# Patient Record
Sex: FEMALE | Race: WHITE | NOT HISPANIC OR LATINO | Employment: OTHER | URBAN - METROPOLITAN AREA
[De-identification: names, ages, dates, MRNs, and addresses within clinical notes are randomized per-mention and may not be internally consistent; named-entity substitution may affect disease eponyms.]

---

## 2021-07-26 ENCOUNTER — HOSPITAL ENCOUNTER (INPATIENT)
Facility: MEDICAL CENTER | Age: 86
LOS: 7 days | DRG: 871 | End: 2021-08-02
Attending: INTERNAL MEDICINE | Admitting: INTERNAL MEDICINE
Payer: MEDICARE

## 2021-07-26 ENCOUNTER — APPOINTMENT (OUTPATIENT)
Dept: RADIOLOGY | Facility: MEDICAL CENTER | Age: 86
DRG: 871 | End: 2021-07-26
Attending: INTERNAL MEDICINE
Payer: MEDICARE

## 2021-07-26 DIAGNOSIS — A41.4: ICD-10-CM

## 2021-07-26 DIAGNOSIS — R65.21: ICD-10-CM

## 2021-07-26 DIAGNOSIS — N17.9 AKI (ACUTE KIDNEY INJURY) (HCC): ICD-10-CM

## 2021-07-26 PROBLEM — E87.1 HYPONATREMIA: Status: ACTIVE | Noted: 2021-07-26

## 2021-07-26 PROBLEM — Z78.9 FULL CODE STATUS: Status: ACTIVE | Noted: 2021-07-26

## 2021-07-26 PROBLEM — K51.00 PANCOLITIS (HCC): Status: ACTIVE | Noted: 2021-07-26

## 2021-07-26 PROBLEM — Z95.0 HISTORY OF PACEMAKER: Status: ACTIVE | Noted: 2021-07-26

## 2021-07-26 LAB
ALBUMIN SERPL BCP-MCNC: 2.5 G/DL (ref 3.2–4.9)
ALBUMIN/GLOB SERPL: 1 G/DL
ALP SERPL-CCNC: 85 U/L (ref 30–99)
ALT SERPL-CCNC: 10 U/L (ref 2–50)
ANION GAP SERPL CALC-SCNC: 9 MMOL/L (ref 7–16)
AST SERPL-CCNC: 9 U/L (ref 12–45)
BASOPHILS # BLD AUTO: 0 % (ref 0–1.8)
BASOPHILS # BLD: 0 K/UL (ref 0–0.12)
BILIRUB SERPL-MCNC: 0.2 MG/DL (ref 0.1–1.5)
BUN SERPL-MCNC: 17 MG/DL (ref 8–22)
BURR CELLS BLD QL SMEAR: NORMAL
CALCIUM SERPL-MCNC: 8.6 MG/DL (ref 8.4–10.2)
CHLORIDE SERPL-SCNC: 99 MMOL/L (ref 96–112)
CO2 SERPL-SCNC: 20 MMOL/L (ref 20–33)
CREAT SERPL-MCNC: 1.27 MG/DL (ref 0.5–1.4)
EOSINOPHIL # BLD AUTO: 0.34 K/UL (ref 0–0.51)
EOSINOPHIL NFR BLD: 1 % (ref 0–6.9)
ERYTHROCYTE [DISTWIDTH] IN BLOOD BY AUTOMATED COUNT: 45.3 FL (ref 35.9–50)
GLOBULIN SER CALC-MCNC: 2.4 G/DL (ref 1.9–3.5)
GLUCOSE SERPL-MCNC: 100 MG/DL (ref 65–99)
HCT VFR BLD AUTO: 28.8 % (ref 37–47)
HGB BLD-MCNC: 9.7 G/DL (ref 12–16)
INR PPP: 1.43 (ref 0.87–1.13)
LACTATE BLD-SCNC: 1.2 MMOL/L (ref 0.5–2)
LYMPHOCYTES # BLD AUTO: 0.68 K/UL (ref 1–4.8)
LYMPHOCYTES NFR BLD: 2 % (ref 22–41)
MAGNESIUM SERPL-MCNC: 1.7 MG/DL (ref 1.5–2.5)
MANUAL DIFF BLD: NORMAL
MCH RBC QN AUTO: 31.5 PG (ref 27–33)
MCHC RBC AUTO-ENTMCNC: 33.7 G/DL (ref 33.6–35)
MCV RBC AUTO: 93.5 FL (ref 81.4–97.8)
MONOCYTES # BLD AUTO: 2.05 K/UL (ref 0–0.85)
MONOCYTES NFR BLD AUTO: 6 % (ref 0–13.4)
NEUTROPHILS # BLD AUTO: 31.03 K/UL (ref 2–7.15)
NEUTROPHILS NFR BLD: 91 % (ref 44–72)
NRBC # BLD AUTO: 0 K/UL
NRBC BLD-RTO: 0 /100 WBC
PHOSPHATE SERPL-MCNC: 2.5 MG/DL (ref 2.5–4.5)
PLATELET # BLD AUTO: 285 K/UL (ref 164–446)
PLATELET BLD QL SMEAR: NORMAL
PMV BLD AUTO: 10.1 FL (ref 9–12.9)
POIKILOCYTOSIS BLD QL SMEAR: NORMAL
POTASSIUM SERPL-SCNC: 4.7 MMOL/L (ref 3.6–5.5)
PROT SERPL-MCNC: 4.9 G/DL (ref 6–8.2)
PROTHROMBIN TIME: 16.4 SEC (ref 12–14.6)
RBC # BLD AUTO: 3.08 M/UL (ref 4.2–5.4)
RBC BLD AUTO: PRESENT
SODIUM SERPL-SCNC: 128 MMOL/L (ref 135–145)
WBC # BLD AUTO: 34.1 K/UL (ref 4.8–10.8)

## 2021-07-26 PROCEDURE — 84484 ASSAY OF TROPONIN QUANT: CPT

## 2021-07-26 PROCEDURE — 83735 ASSAY OF MAGNESIUM: CPT

## 2021-07-26 PROCEDURE — 74018 RADEX ABDOMEN 1 VIEW: CPT

## 2021-07-26 PROCEDURE — 700105 HCHG RX REV CODE 258: Performed by: INTERNAL MEDICINE

## 2021-07-26 PROCEDURE — 94760 N-INVAS EAR/PLS OXIMETRY 1: CPT

## 2021-07-26 PROCEDURE — A9270 NON-COVERED ITEM OR SERVICE: HCPCS | Performed by: INTERNAL MEDICINE

## 2021-07-26 PROCEDURE — 85027 COMPLETE CBC AUTOMATED: CPT

## 2021-07-26 PROCEDURE — 85007 BL SMEAR W/DIFF WBC COUNT: CPT

## 2021-07-26 PROCEDURE — 36556 INSERT NON-TUNNEL CV CATH: CPT | Mod: RT | Performed by: INTERNAL MEDICINE

## 2021-07-26 PROCEDURE — 700101 HCHG RX REV CODE 250: Performed by: INTERNAL MEDICINE

## 2021-07-26 PROCEDURE — 85610 PROTHROMBIN TIME: CPT

## 2021-07-26 PROCEDURE — 87040 BLOOD CULTURE FOR BACTERIA: CPT

## 2021-07-26 PROCEDURE — 99291 CRITICAL CARE FIRST HOUR: CPT | Mod: 25 | Performed by: INTERNAL MEDICINE

## 2021-07-26 PROCEDURE — 83605 ASSAY OF LACTIC ACID: CPT

## 2021-07-26 PROCEDURE — 93005 ELECTROCARDIOGRAM TRACING: CPT | Performed by: INTERNAL MEDICINE

## 2021-07-26 PROCEDURE — 02HV33Z INSERTION OF INFUSION DEVICE INTO SUPERIOR VENA CAVA, PERCUTANEOUS APPROACH: ICD-10-PCS | Performed by: INTERNAL MEDICINE

## 2021-07-26 PROCEDURE — 770022 HCHG ROOM/CARE - ICU (200)

## 2021-07-26 PROCEDURE — 80053 COMPREHEN METABOLIC PANEL: CPT

## 2021-07-26 PROCEDURE — 700102 HCHG RX REV CODE 250 W/ 637 OVERRIDE(OP): Performed by: INTERNAL MEDICINE

## 2021-07-26 PROCEDURE — 84100 ASSAY OF PHOSPHORUS: CPT

## 2021-07-26 PROCEDURE — 700111 HCHG RX REV CODE 636 W/ 250 OVERRIDE (IP): Performed by: INTERNAL MEDICINE

## 2021-07-26 RX ORDER — ONDANSETRON 4 MG/1
4 TABLET, ORALLY DISINTEGRATING ORAL EVERY 4 HOURS PRN
Status: DISCONTINUED | OUTPATIENT
Start: 2021-07-26 | End: 2021-08-02 | Stop reason: HOSPADM

## 2021-07-26 RX ORDER — ONDANSETRON 2 MG/ML
4 INJECTION INTRAMUSCULAR; INTRAVENOUS EVERY 4 HOURS PRN
Status: DISCONTINUED | OUTPATIENT
Start: 2021-07-26 | End: 2021-08-02 | Stop reason: HOSPADM

## 2021-07-26 RX ORDER — NOREPINEPHRINE BITARTRATE 0.03 MG/ML
.5-3 INJECTION, SOLUTION INTRAVENOUS CONTINUOUS
Status: DISCONTINUED | OUTPATIENT
Start: 2021-07-26 | End: 2021-07-28

## 2021-07-26 RX ORDER — SIMVASTATIN 10 MG
10 TABLET ORAL EVERY EVENING
Status: DISCONTINUED | OUTPATIENT
Start: 2021-07-26 | End: 2021-08-02 | Stop reason: HOSPADM

## 2021-07-26 RX ORDER — OMEPRAZOLE 20 MG/1
20 CAPSULE, DELAYED RELEASE ORAL DAILY
Status: DISCONTINUED | OUTPATIENT
Start: 2021-07-26 | End: 2021-07-27

## 2021-07-26 RX ORDER — ACETAMINOPHEN 325 MG/1
650 TABLET ORAL EVERY 6 HOURS PRN
Status: DISCONTINUED | OUTPATIENT
Start: 2021-07-26 | End: 2021-08-02 | Stop reason: HOSPADM

## 2021-07-26 RX ORDER — SODIUM CHLORIDE, SODIUM LACTATE, POTASSIUM CHLORIDE, CALCIUM CHLORIDE 600; 310; 30; 20 MG/100ML; MG/100ML; MG/100ML; MG/100ML
INJECTION, SOLUTION INTRAVENOUS CONTINUOUS
Status: DISCONTINUED | OUTPATIENT
Start: 2021-07-26 | End: 2021-07-28

## 2021-07-26 RX ADMIN — METRONIDAZOLE 500 MG: 500 INJECTION, SOLUTION INTRAVENOUS at 16:20

## 2021-07-26 RX ADMIN — ACETAMINOPHEN 650 MG: 325 TABLET, FILM COATED ORAL at 17:53

## 2021-07-26 RX ADMIN — NOREPINEPHRINE BITARTRATE 10 MCG/MIN: 1 INJECTION, SOLUTION, CONCENTRATE INTRAVENOUS at 17:15

## 2021-07-26 RX ADMIN — OMEPRAZOLE 20 MG: 20 CAPSULE, DELAYED RELEASE ORAL at 16:21

## 2021-07-26 RX ADMIN — ENOXAPARIN SODIUM 40 MG: 40 INJECTION SUBCUTANEOUS at 16:20

## 2021-07-26 RX ADMIN — METRONIDAZOLE 500 MG: 500 INJECTION, SOLUTION INTRAVENOUS at 22:03

## 2021-07-26 RX ADMIN — SIMVASTATIN 10 MG: 10 TABLET, FILM COATED ORAL at 17:15

## 2021-07-26 RX ADMIN — SODIUM CHLORIDE, POTASSIUM CHLORIDE, SODIUM LACTATE AND CALCIUM CHLORIDE: 600; 310; 30; 20 INJECTION, SOLUTION INTRAVENOUS at 16:20

## 2021-07-26 RX ADMIN — VANCOMYCIN HYDROCHLORIDE 500 MG: KIT ORAL at 17:15

## 2021-07-26 ASSESSMENT — COGNITIVE AND FUNCTIONAL STATUS - GENERAL
DRESSING REGULAR UPPER BODY CLOTHING: A LITTLE
STANDING UP FROM CHAIR USING ARMS: A LITTLE
PERSONAL GROOMING: A LITTLE
DRESSING REGULAR LOWER BODY CLOTHING: A LITTLE
MOBILITY SCORE: 19
CLIMB 3 TO 5 STEPS WITH RAILING: A LITTLE
MOVING FROM LYING ON BACK TO SITTING ON SIDE OF FLAT BED: A LITTLE
TOILETING: A LITTLE
MOVING TO AND FROM BED TO CHAIR: A LITTLE
SUGGESTED CMS G CODE MODIFIER DAILY ACTIVITY: CJ
WALKING IN HOSPITAL ROOM: A LITTLE
DAILY ACTIVITIY SCORE: 20
SUGGESTED CMS G CODE MODIFIER MOBILITY: CK

## 2021-07-26 ASSESSMENT — ENCOUNTER SYMPTOMS
SORE THROAT: 0
VOMITING: 0
STRIDOR: 0
SINUS PAIN: 0
FOCAL WEAKNESS: 0
EYE DISCHARGE: 0
DIZZINESS: 0
ABDOMINAL PAIN: 0
NAUSEA: 0
PSYCHIATRIC NEGATIVE: 1
FEVER: 0
WHEEZING: 0
CHILLS: 0
SENSORY CHANGE: 0
EYE PAIN: 0
WEIGHT LOSS: 0
HEADACHES: 0
COUGH: 0
ORTHOPNEA: 0
MYALGIAS: 0
LOSS OF CONSCIOUSNESS: 0
ROS GI COMMENTS: DISTENSION
SHORTNESS OF BREATH: 0
DIARRHEA: 0
SPUTUM PRODUCTION: 0

## 2021-07-26 ASSESSMENT — LIFESTYLE VARIABLES
ALCOHOL_USE: YES
HAVE PEOPLE ANNOYED YOU BY CRITICIZING YOUR DRINKING: NO
TOTAL SCORE: 0
EVER HAD A DRINK FIRST THING IN THE MORNING TO STEADY YOUR NERVES TO GET RID OF A HANGOVER: NO
CONSUMPTION TOTAL: NEGATIVE
TOTAL SCORE: 0
EVER FELT BAD OR GUILTY ABOUT YOUR DRINKING: NO
TOTAL SCORE: 0
HAVE YOU EVER FELT YOU SHOULD CUT DOWN ON YOUR DRINKING: NO
ON A TYPICAL DAY WHEN YOU DRINK ALCOHOL HOW MANY DRINKS DO YOU HAVE: 1
AVERAGE NUMBER OF DAYS PER WEEK YOU HAVE A DRINK CONTAINING ALCOHOL: 3
HOW MANY TIMES IN THE PAST YEAR HAVE YOU HAD 5 OR MORE DRINKS IN A DAY: 0

## 2021-07-26 ASSESSMENT — PATIENT HEALTH QUESTIONNAIRE - PHQ9
SUM OF ALL RESPONSES TO PHQ9 QUESTIONS 1 AND 2: 0
1. LITTLE INTEREST OR PLEASURE IN DOING THINGS: NOT AT ALL
2. FEELING DOWN, DEPRESSED, IRRITABLE, OR HOPELESS: NOT AT ALL

## 2021-07-26 NOTE — ASSESSMENT & PLAN NOTE
Due to cdiff colitis  Images from outside hospital have been uploaded  Cont daily KUB  Plan of care as outlined above.

## 2021-07-26 NOTE — PROGRESS NOTES
[facility]PITAL[facility] TRIAGE OFFICER DIRECT ADMISSION REPORT  Transferring facility: [facility]  Transferring physician: Pasquale Ghotra M.D.  Transferring facility/physician contact number: [phone]  Chief complaint: Diarrhea  Pertinent history & patient course: Admitted OBV at OSH for hyponatremia and diarrhea. Started on IVF and C diff PCR +. Worsening hypotension today despite IVF and albumin, started on norepinephrine infusion. Critical access hospital, unable to provide CC management.  Pertinent imaging & lab results: CT with pancolitis and no ileus/megacolon. C diff PCR positive.  Code Status: FULL per transferring provider, I personally verified with the transferring provider patient's code status and the transferring provider has confirmed this with the patient.  Further work up or recommendations per triage officer prior to transfer: continue Levophed  Consultants called prior to transfer and pertinent input from consultants: [facility] Intensivist   Patient accepted for transfer: Yes  Consultants to be called upon arrival: none  Admission status: Inpatient.   Floor requested: ICU  If ICU transfer, name of intensivist case discussed with and pertinent input from critical care: Zeinab    Please inform the triage officer upon arrival of the patient to [facility] for assignment of a hospitalist to perform admission.     For any question or concerns regarding the care of this patient, please reach out to the assigned hospitalist.

## 2021-07-26 NOTE — ASSESSMENT & PLAN NOTE
This is Septic shock Present on admission  SIRS criteria identified on my evaluation include: Tachycardia, with heart rate greater than 90 BPM and Bandemia, greater than 10% bands  Source is c diff pancolitis  Presentation includes: Severe sepsis present and persistent hypotension after 30 ml/kg completed.   Despite appropriate fluid resuscitation with crystalloid given per sepsis guidelines, the patient remains hypotensive with systolic blood pressure less than 90 or MAP less than 65  Hemodynamic support with additional fluids and IV vasopressors as needed to maintain a SBP of 90 or MAP of 65  IV antibiotics as appropriate for source of sepsis  Reassessment: I have reassessed the patient's hemodynamic status    Has made dramatic improvement in the last 24 hours, weaned off of Levophed, WBC from 39 -> 21k  Appreciate ID and Gen surg input  Lactic acid < 2, renal function stable  Cont LR @100 cc/hr, start CLD  Transfer to telemetry, maintain hernandez and central line for now

## 2021-07-26 NOTE — CONSULTS
Critical Care Consultation    Date of consult: 7/26/2021    Referring Physician  Dr STEVEN Ghotra    Reason for Consultation  Septic shock due to C. difficile pancolitis    History of Presenting Illness    Very pleasant 89-year-old female with past medical history of pacemaker placement, hypertension, hyperlipidemia, hydrochlorothiazide, lisinopril, metoprolol, omeprazole, simvastatin transferred from [facility] in [place], [place] on 7/26/2021 for septic shock due to C. difficile pancolitis.  She presented there on 7/23 with complaints of weakness and diarrhea.    Prior to admission she had several days of soft normal colored stools without blood along with occasional nausea and nonbloody nonbilious emesis.  She was admitted and started on IV fluids and empiric antibiotics with vancomycin/Zosyn.  KARIS noted on admission.  She became hypotensive and febrile on hospital day #2, prompting labs showing a substantial leukocytosis and C. difficile positive stool.  CT abdomen/pelvis 7/25 revealed pancolitis without SBO or megacolon.  Diverticulosis without diverticulitis in the descending and sigmoid colon. IV antibiotics were discontinued and she was transitioned to oral vancomycin and IV Flagyl.  Blood cultures reportedly negative.  Hypotension was refractory to resuscitation she was started on IV vasopressors for hemodynamic support was subsequently transferred for higher level of care.    Labs this morning from outside hospital notable for WBC 34.9, hemoglobin 9.1, platelets 259.  20% bands.    Sodium 133, potassium 3.9, bicarb 21, creatinine 1.48    Stool PCR collected 7/25+ for C. difficile toxin A/P.  SARS-CoV-2 rapid JOSE negative.    Cardiac: levophed @ 3mcg/min  Pulm: 3LPM NC  Neuro: intact, speech slightly delayed  Heme: Leukocytosis, bandemia  ID:  PO vanco, IV Flagyl  GI/endo: NPO, sips with meds, LR @100cc/hr  Labs/Imaging: AXR and CXR ordered  Lines: RIJ central line placed on admit  7/26/2021  Mobility: bedrest  Symptoms: distension    Code Status  Full Code    Review of Systems  Review of Systems   Constitutional: Negative for chills, fever and weight loss.   HENT: Negative for congestion, sinus pain and sore throat.    Eyes: Negative for pain and discharge.   Respiratory: Negative for cough, sputum production, shortness of breath, wheezing and stridor.    Cardiovascular: Negative for chest pain, orthopnea and leg swelling.   Gastrointestinal: Negative for abdominal pain, diarrhea, nausea and vomiting.        Distension   Genitourinary: Negative for dysuria, frequency and urgency.   Musculoskeletal: Negative for myalgias.   Skin: Negative for rash.   Neurological: Negative for dizziness, sensory change, focal weakness, loss of consciousness and headaches.   Psychiatric/Behavioral: Negative.    All other systems reviewed and are negative.    Past Medical History   has a past medical history of Anxiety, Arrhythmia, Cough, Hypertension, and Pacemaker.  past medical history of pacemaker placement, hypertension, hyperlipidemia, hydrochlorothiazide, lisinopril, metoprolol, omeprazole, simvastatin    Surgical History  Pacemaker placement    Family History  Family history reviewed and no significant conditions or diseases relevant to the chief complaint were identified    Social History   reports that she quit smoking about 41 years ago. She has never used smokeless tobacco. She reports current alcohol use of about 1.8 oz of alcohol per week. She reports that she does not use drugs.    Medications  Home Medications    **Home medications have not yet been reviewed for this encounter**       Current Facility-Administered Medications   Medication Dose Route Frequency Provider Last Rate Last Admin   • lactated ringers infusion   Intravenous Continuous Mp Forde M.D.       • enoxaparin (LOVENOX) inj 40 mg  40 mg Subcutaneous DAILY Mp Forde M.D.       • ondansetron (ZOFRAN) syringe/vial  injection 4 mg  4 mg Intravenous Q4HRS PRN Mp Forde M.D.       • ondansetron (ZOFRAN ODT) dispertab 4 mg  4 mg Oral Q4HRS PRN Mp Forde M.D.       • omeprazole (PRILOSEC) capsule 20 mg  20 mg Oral DAILY Mp Forde M.D.       • simvastatin (ZOCOR) tablet 10 mg  10 mg Oral Q EVENING Mp Forde M.D.       • vancomycin 50 mg/mL oral soln 500 mg  500 mg Oral Q6HRS Mp Forde M.D.        And   • metroNIDAZOLE (FLAGYL) IVPB 500 mg  500 mg Intravenous Q8HRS Mp Forde M.D.         Allergies  No Known Allergies    Vital Signs last 24 hours  Temp:  [36.8 °C (98.3 °F)] 36.8 °C (98.3 °F)  Pulse:  [56-70] 65  Resp:  [26-32] 32  BP: ()/(41-61) 108/61  SpO2:  [93 %-97 %] 93 %    Physical Exam  Physical Exam  Vitals and nursing note reviewed.   Constitutional:       General: She is not in acute distress.     Appearance: Normal appearance. She is well-developed. She is not diaphoretic.   Eyes:      General: No scleral icterus.        Right eye: No discharge.         Left eye: No discharge.      Conjunctiva/sclera: Conjunctivae normal.      Pupils: Pupils are equal, round, and reactive to light.   Neck:      Thyroid: No thyromegaly.      Vascular: No JVD.   Cardiovascular:      Rate and Rhythm: Normal rate and regular rhythm.      Heart sounds: Normal heart sounds. No murmur heard.   No gallop.    Pulmonary:      Effort: Pulmonary effort is normal. No respiratory distress.      Breath sounds: Normal breath sounds. No wheezing or rales.   Abdominal:      General: There is distension.      Palpations: Abdomen is soft.      Tenderness: There is abdominal tenderness. There is no guarding.      Comments: Decreased BS   Musculoskeletal:         General: No tenderness.   Lymphadenopathy:      Cervical: No cervical adenopathy.   Skin:     General: Skin is warm.      Capillary Refill: Capillary refill takes less than 2 seconds.      Findings: No erythema or rash.   Neurological:      General:  No focal deficit present.      Mental Status: She is alert and oriented to person, place, and time.      Cranial Nerves: No cranial nerve deficit.      Sensory: No sensory deficit.      Motor: No abnormal muscle tone.   Psychiatric:         Behavior: Behavior normal.       Fluids  No intake or output data in the 24 hours ending 07/26/21 1602    Laboratory  Recent Results (from the past 48 hour(s))   Prothrombin time (INR)    Collection Time: 07/26/21  3:20 PM   Result Value Ref Range    PT 16.4 (H) 12.0 - 14.6 sec    INR 1.43 (H) 0.87 - 1.13   CBC with Differential    Collection Time: 07/26/21  3:20 PM   Result Value Ref Range    WBC 34.1 (HH) 4.8 - 10.8 K/uL    RBC 3.08 (L) 4.20 - 5.40 M/uL    Hemoglobin 9.7 (L) 12.0 - 16.0 g/dL    Hematocrit 28.8 (L) 37.0 - 47.0 %    MCV 93.5 81.4 - 97.8 fL    MCH 31.5 27.0 - 33.0 pg    MCHC 33.7 33.6 - 35.0 g/dL    RDW 45.3 35.9 - 50.0 fL    Platelet Count 285 164 - 446 K/uL    MPV 10.1 9.0 - 12.9 fL    Neutrophils-Polys 91.00 (H) 44.00 - 72.00 %    Lymphocytes 2.00 (L) 22.00 - 41.00 %    Monocytes 6.00 0.00 - 13.40 %    Eosinophils 1.00 0.00 - 6.90 %    Basophils 0.00 0.00 - 1.80 %    Nucleated RBC 0.00 /100 WBC    Neutrophils (Absolute) 31.03 (H) 2.00 - 7.15 K/uL    Lymphs (Absolute) 0.68 (L) 1.00 - 4.80 K/uL    Monos (Absolute) 2.05 (H) 0.00 - 0.85 K/uL    Eos (Absolute) 0.34 0.00 - 0.51 K/uL    Baso (Absolute) 0.00 0.00 - 0.12 K/uL    NRBC (Absolute) 0.00 K/uL   Comp Metabolic Panel    Collection Time: 07/26/21  3:20 PM   Result Value Ref Range    Sodium 128 (L) 135 - 145 mmol/L    Potassium 4.7 3.6 - 5.5 mmol/L    Chloride 99 96 - 112 mmol/L    Co2 20 20 - 33 mmol/L    Anion Gap 9.0 7.0 - 16.0    Glucose 100 (H) 65 - 99 mg/dL    Bun 17 8 - 22 mg/dL    Creatinine 1.27 0.50 - 1.40 mg/dL    Calcium 8.6 8.4 - 10.2 mg/dL    AST(SGOT) 9 (L) 12 - 45 U/L    ALT(SGPT) 10 2 - 50 U/L    Alkaline Phosphatase 85 30 - 99 U/L    Total Bilirubin 0.2 0.1 - 1.5 mg/dL    Albumin 2.5 (L) 3.2 -  4.9 g/dL    Total Protein 4.9 (L) 6.0 - 8.2 g/dL    Globulin 2.4 1.9 - 3.5 g/dL    A-G Ratio 1.0 g/dL   LACTIC ACID    Collection Time: 07/26/21  3:20 PM   Result Value Ref Range    Lactic Acid 1.2 0.5 - 2.0 mmol/L   MAGNESIUM    Collection Time: 07/26/21  3:20 PM   Result Value Ref Range    Magnesium 1.7 1.5 - 2.5 mg/dL   PHOSPHORUS    Collection Time: 07/26/21  3:20 PM   Result Value Ref Range    Phosphorus 2.5 2.5 - 4.5 mg/dL   ESTIMATED GFR    Collection Time: 07/26/21  3:20 PM   Result Value Ref Range    GFR If  48 (A) >60 mL/min/1.73 m 2    GFR If Non African American 40 (A) >60 mL/min/1.73 m 2   DIFFERENTIAL MANUAL    Collection Time: 07/26/21  3:20 PM   Result Value Ref Range    Manual Diff Status PERFORMED    PLATELET ESTIMATE    Collection Time: 07/26/21  3:20 PM   Result Value Ref Range    Plt Estimation Normal    MORPHOLOGY    Collection Time: 07/26/21  3:20 PM   Result Value Ref Range    RBC Morphology Present     Poikilocytosis 2+     Echinocytes 2+      Imaging  [id number] VIEW    (Results Pending)   DX-CHEST-FOR LINE PLACEMENT Perform procedure in: Patient's Room    (Results Pending)      CT abdomen/pelvis 7/25 revealed pancolitis without SBO or megacolon.  Diverticulosis without diverticulitis in the descending and sigmoid colon.     Assessment/Plan    * Septic shock due to Clostridioides difficile (HCC)  Assessment & Plan  This is Septic shock Present on admission  SIRS criteria identified on my evaluation include: Tachycardia, with heart rate greater than 90 BPM and Bandemia, greater than 10% bands  Source is c diff pancolitis  Presentation includes: Severe sepsis present and persistent hypotension after 30 ml/kg completed.   Despite appropriate fluid resuscitation with crystalloid given per sepsis guidelines, the patient remains hypotensive with systolic blood pressure less than 90 or MAP less than 65  Hemodynamic support with additional fluids and IV vasopressors as needed to  maintain a SBP of 90 or MAP of 65  IV antibiotics as appropriate for source of sepsis  Reassessment: I have reassessed the patient's hemodynamic status    Central line placed  Titrate Levophed to maintain SBP greater than 85, MAP greater than 65  Adequately resuscitated on bedside ultrasound exam  LR @100 cc/hr, NPO  ATLAS 6 = severe C diff. Cont PO vanco/IV Flagyl  Trend lactate daily  Discussed surgical options, pt open to surgery/bowel resection if necessary for preservation of life    KARIS (acute kidney injury) (HCC)  Assessment & Plan  Stable  Strict IOs  Avoid nephrotoxins    History of pacemaker  Assessment & Plan  Currently rate controlled SR    Pancolitis (HCC)  Assessment & Plan  Due to cdiff colitis  Plan of care as outlined above.    Full code status  Assessment & Plan  Confirmed with patient 7/26/2021    Discussed patient condition and risk of morbidity and/or mortality with RN, RT, Pharmacy and Patient.      The patient remains critically ill.  Critical care time = 50 minutes in directly providing and coordinating critical care and extensive data review.  No time overlap and excludes procedures.    This note was generated using voice recognition software which has a chance of producing errors of grammar and content.  I have made every reasonable attempt to find and correct any errors, but it should be expected that some may not be found prior to finalization of this note.  __________  Mp Forde MD  Pulmonary and Critical Care Medicine  [facility]

## 2021-07-26 NOTE — PROGRESS NOTES
1400: Pt arrived to unit with transport team. Pt transferred to ICU bed and placed on ICU monitoring. Pt has no complaints of pain at this time, VSS. Pt not on any gtts at time of arrival. Pt resting in bed. All needs met, awaiting MD orders.

## 2021-07-26 NOTE — PROCEDURES
Central Line Insertion    Date/Time: 7/26/2021 4:01 PM  Performed by: Mp Forde M.D.  Authorized by: Mp Forde M.D.     Consent:     Consent obtained:  Written    Consent given by:  Patient    Risks discussed:  Arterial puncture, incorrect placement, nerve damage, pneumothorax, infection and bleeding    Alternatives discussed:  No treatment  Pre-procedure details:     Hand hygiene: Hand hygiene performed prior to insertion      Skin preparation:  ChloraPrep    Skin preparation agent: Skin preparation agent completely dried prior to procedure    Anesthesia:     Anesthesia method:  Local infiltration    Local anesthetic:  Lidocaine 1% w/o epi  Procedure details:     Location:  R internal jugular    Site selection rationale:  Left SC occupied by PM    Patient position:  Flat    Procedural supplies:  Triple lumen    Catheter size:  7.5 Fr    Landmarks identified: yes      Ultrasound guidance: yes      Sterile ultrasound techniques: Sterile gel and sterile probe covers were used      Number of attempts:  1    Successful placement: yes    Post-procedure details:     Post-procedure:  Dressing applied and line sutured    Assessment:  Blood return through all ports, no pneumothorax on x-ray, free fluid flow and placement verified by x-ray    Patient tolerance of procedure:  Tolerated well, no immediate complications    __________  Mp Forde MD  Pulmonary and Critical Care Medicine  [facility]

## 2021-07-27 ENCOUNTER — HOSPITAL ENCOUNTER (OUTPATIENT)
Dept: RADIOLOGY | Facility: MEDICAL CENTER | Age: 86
End: 2021-07-27

## 2021-07-27 ENCOUNTER — APPOINTMENT (OUTPATIENT)
Dept: RADIOLOGY | Facility: MEDICAL CENTER | Age: 86
DRG: 871 | End: 2021-07-27
Attending: INTERNAL MEDICINE
Payer: MEDICARE

## 2021-07-27 LAB
ALBUMIN SERPL BCP-MCNC: 2.4 G/DL (ref 3.2–4.9)
ALBUMIN/GLOB SERPL: 1 G/DL
ALP SERPL-CCNC: 128 U/L (ref 30–99)
ALT SERPL-CCNC: 8 U/L (ref 2–50)
ANION GAP SERPL CALC-SCNC: 11 MMOL/L (ref 7–16)
ANION GAP SERPL CALC-SCNC: 11 MMOL/L (ref 7–16)
AST SERPL-CCNC: 7 U/L (ref 12–45)
BASOPHILS # BLD AUTO: 0.6 % (ref 0–1.8)
BASOPHILS # BLD: 0.22 K/UL (ref 0–0.12)
BILIRUB SERPL-MCNC: 0.2 MG/DL (ref 0.1–1.5)
BUN SERPL-MCNC: 16 MG/DL (ref 8–22)
BUN SERPL-MCNC: 17 MG/DL (ref 8–22)
CALCIUM SERPL-MCNC: 8.7 MG/DL (ref 8.4–10.2)
CALCIUM SERPL-MCNC: 8.8 MG/DL (ref 8.4–10.2)
CHLORIDE SERPL-SCNC: 101 MMOL/L (ref 96–112)
CHLORIDE SERPL-SCNC: 104 MMOL/L (ref 96–112)
CO2 SERPL-SCNC: 17 MMOL/L (ref 20–33)
CO2 SERPL-SCNC: 18 MMOL/L (ref 20–33)
COMMENT 1642: NORMAL
CREAT SERPL-MCNC: 0.99 MG/DL (ref 0.5–1.4)
CREAT SERPL-MCNC: 1.08 MG/DL (ref 0.5–1.4)
EKG IMPRESSION: NORMAL
EOSINOPHIL # BLD AUTO: 0.07 K/UL (ref 0–0.51)
EOSINOPHIL NFR BLD: 0.2 % (ref 0–6.9)
ERYTHROCYTE [DISTWIDTH] IN BLOOD BY AUTOMATED COUNT: 45.1 FL (ref 35.9–50)
ERYTHROCYTE [DISTWIDTH] IN BLOOD BY AUTOMATED COUNT: 45.5 FL (ref 35.9–50)
GLOBULIN SER CALC-MCNC: 2.3 G/DL (ref 1.9–3.5)
GLUCOSE SERPL-MCNC: 105 MG/DL (ref 65–99)
GLUCOSE SERPL-MCNC: 99 MG/DL (ref 65–99)
HCT VFR BLD AUTO: 28.7 % (ref 37–47)
HCT VFR BLD AUTO: 29.5 % (ref 37–47)
HGB BLD-MCNC: 9.5 G/DL (ref 12–16)
HGB BLD-MCNC: 9.9 G/DL (ref 12–16)
IMM GRANULOCYTES # BLD AUTO: 1.47 K/UL (ref 0–0.11)
IMM GRANULOCYTES NFR BLD AUTO: 3.7 % (ref 0–0.9)
LACTATE BLD-SCNC: 1.2 MMOL/L (ref 0.5–2)
LACTATE BLD-SCNC: 1.4 MMOL/L (ref 0.5–2)
LYMPHOCYTES # BLD AUTO: 0.91 K/UL (ref 1–4.8)
LYMPHOCYTES NFR BLD: 2.3 % (ref 22–41)
MAGNESIUM SERPL-MCNC: 1.5 MG/DL (ref 1.5–2.5)
MCH RBC QN AUTO: 30.9 PG (ref 27–33)
MCH RBC QN AUTO: 31.4 PG (ref 27–33)
MCHC RBC AUTO-ENTMCNC: 33.1 G/DL (ref 33.6–35)
MCHC RBC AUTO-ENTMCNC: 33.6 G/DL (ref 33.6–35)
MCV RBC AUTO: 93.5 FL (ref 81.4–97.8)
MCV RBC AUTO: 93.7 FL (ref 81.4–97.8)
MONOCYTES # BLD AUTO: 1.87 K/UL (ref 0–0.85)
MONOCYTES NFR BLD AUTO: 4.7 % (ref 0–13.4)
NEUTROPHILS # BLD AUTO: 35.85 K/UL (ref 2–7.15)
NEUTROPHILS NFR BLD: 89 % (ref 44–72)
NRBC # BLD AUTO: 0 K/UL
NRBC BLD-RTO: 0 /100 WBC
PHOSPHATE SERPL-MCNC: 2.7 MG/DL (ref 2.5–4.5)
PLATELET # BLD AUTO: 332 K/UL (ref 164–446)
PLATELET # BLD AUTO: 340 K/UL (ref 164–446)
PMV BLD AUTO: 10.2 FL (ref 9–12.9)
PMV BLD AUTO: 9.6 FL (ref 9–12.9)
POTASSIUM SERPL-SCNC: 4.8 MMOL/L (ref 3.6–5.5)
POTASSIUM SERPL-SCNC: 4.9 MMOL/L (ref 3.6–5.5)
PROT SERPL-MCNC: 4.7 G/DL (ref 6–8.2)
RBC # BLD AUTO: 3.07 M/UL (ref 4.2–5.4)
RBC # BLD AUTO: 3.15 M/UL (ref 4.2–5.4)
SODIUM SERPL-SCNC: 129 MMOL/L (ref 135–145)
SODIUM SERPL-SCNC: 133 MMOL/L (ref 135–145)
TROPONIN T SERPL-MCNC: 24 NG/L (ref 6–19)
TROPONIN T SERPL-MCNC: 31 NG/L (ref 6–19)
WBC # BLD AUTO: 32.9 K/UL (ref 4.8–10.8)
WBC # BLD AUTO: 39.4 K/UL (ref 4.8–10.8)

## 2021-07-27 PROCEDURE — 85025 COMPLETE CBC W/AUTO DIFF WBC: CPT

## 2021-07-27 PROCEDURE — 93010 ELECTROCARDIOGRAM REPORT: CPT | Performed by: INTERNAL MEDICINE

## 2021-07-27 PROCEDURE — 700101 HCHG RX REV CODE 250: Performed by: INTERNAL MEDICINE

## 2021-07-27 PROCEDURE — 85027 COMPLETE CBC AUTOMATED: CPT

## 2021-07-27 PROCEDURE — 99223 1ST HOSP IP/OBS HIGH 75: CPT | Performed by: INTERNAL MEDICINE

## 2021-07-27 PROCEDURE — 770022 HCHG ROOM/CARE - ICU (200)

## 2021-07-27 PROCEDURE — 99292 CRITICAL CARE ADDL 30 MIN: CPT | Performed by: INTERNAL MEDICINE

## 2021-07-27 PROCEDURE — 99291 CRITICAL CARE FIRST HOUR: CPT | Performed by: INTERNAL MEDICINE

## 2021-07-27 PROCEDURE — 84100 ASSAY OF PHOSPHORUS: CPT

## 2021-07-27 PROCEDURE — 80048 BASIC METABOLIC PNL TOTAL CA: CPT

## 2021-07-27 PROCEDURE — 80053 COMPREHEN METABOLIC PANEL: CPT

## 2021-07-27 PROCEDURE — 83605 ASSAY OF LACTIC ACID: CPT

## 2021-07-27 PROCEDURE — 36592 COLLECT BLOOD FROM PICC: CPT

## 2021-07-27 PROCEDURE — 84484 ASSAY OF TROPONIN QUANT: CPT

## 2021-07-27 PROCEDURE — 83735 ASSAY OF MAGNESIUM: CPT

## 2021-07-27 PROCEDURE — 700102 HCHG RX REV CODE 250 W/ 637 OVERRIDE(OP): Performed by: INTERNAL MEDICINE

## 2021-07-27 PROCEDURE — A9270 NON-COVERED ITEM OR SERVICE: HCPCS | Performed by: INTERNAL MEDICINE

## 2021-07-27 PROCEDURE — 700111 HCHG RX REV CODE 636 W/ 250 OVERRIDE (IP): Performed by: INTERNAL MEDICINE

## 2021-07-27 PROCEDURE — 74018 RADEX ABDOMEN 1 VIEW: CPT

## 2021-07-27 PROCEDURE — 700105 HCHG RX REV CODE 258: Performed by: INTERNAL MEDICINE

## 2021-07-27 RX ADMIN — SODIUM CHLORIDE, POTASSIUM CHLORIDE, SODIUM LACTATE AND CALCIUM CHLORIDE: 600; 310; 30; 20 INJECTION, SOLUTION INTRAVENOUS at 12:20

## 2021-07-27 RX ADMIN — OMEPRAZOLE 20 MG: 20 CAPSULE, DELAYED RELEASE ORAL at 05:33

## 2021-07-27 RX ADMIN — ONDANSETRON 4 MG: 2 INJECTION INTRAMUSCULAR; INTRAVENOUS at 06:08

## 2021-07-27 RX ADMIN — ENOXAPARIN SODIUM 40 MG: 40 INJECTION SUBCUTANEOUS at 05:33

## 2021-07-27 RX ADMIN — METRONIDAZOLE 500 MG: 500 INJECTION, SOLUTION INTRAVENOUS at 14:55

## 2021-07-27 RX ADMIN — METRONIDAZOLE 500 MG: 500 INJECTION, SOLUTION INTRAVENOUS at 05:34

## 2021-07-27 RX ADMIN — VANCOMYCIN HYDROCHLORIDE 500 MG: KIT ORAL at 05:33

## 2021-07-27 RX ADMIN — METRONIDAZOLE 500 MG: 500 INJECTION, SOLUTION INTRAVENOUS at 21:14

## 2021-07-27 RX ADMIN — VANCOMYCIN HYDROCHLORIDE 500 MG: KIT ORAL at 18:07

## 2021-07-27 RX ADMIN — VANCOMYCIN HYDROCHLORIDE 500 MG: KIT ORAL at 00:42

## 2021-07-27 RX ADMIN — NOREPINEPHRINE BITARTRATE 7 MCG/MIN: 1 INJECTION, SOLUTION, CONCENTRATE INTRAVENOUS at 12:19

## 2021-07-27 RX ADMIN — SODIUM CHLORIDE, POTASSIUM CHLORIDE, SODIUM LACTATE AND CALCIUM CHLORIDE: 600; 310; 30; 20 INJECTION, SOLUTION INTRAVENOUS at 22:31

## 2021-07-27 RX ADMIN — SODIUM CHLORIDE, POTASSIUM CHLORIDE, SODIUM LACTATE AND CALCIUM CHLORIDE: 600; 310; 30; 20 INJECTION, SOLUTION INTRAVENOUS at 02:22

## 2021-07-27 RX ADMIN — VANCOMYCIN HYDROCHLORIDE 500 MG: KIT ORAL at 12:10

## 2021-07-27 ASSESSMENT — ENCOUNTER SYMPTOMS
SINUS PAIN: 0
SPUTUM PRODUCTION: 0
LOSS OF CONSCIOUSNESS: 0
WHEEZING: 0
DIARRHEA: 0
SENSORY CHANGE: 0
NAUSEA: 0
WEIGHT LOSS: 0
ROS GI COMMENTS: DISTENSION
CHILLS: 0
DIZZINESS: 0
ORTHOPNEA: 0
ABDOMINAL PAIN: 0
SORE THROAT: 0
MYALGIAS: 0
FOCAL WEAKNESS: 0
HEADACHES: 0
EYE DISCHARGE: 0
PSYCHIATRIC NEGATIVE: 1
COUGH: 0
STRIDOR: 0
SHORTNESS OF BREATH: 0
FEVER: 0
EYE PAIN: 0
VOMITING: 0

## 2021-07-27 ASSESSMENT — PAIN DESCRIPTION - PAIN TYPE
TYPE: ACUTE PAIN

## 2021-07-27 NOTE — PROGRESS NOTES
"23:30 - Pt used call light to call this RN to room, this RN noticed pt started having increased work of breathing. Pt stating, \"my stomach doesn't feel right.\" Pt requesting bedpan and had medium, loose bowel movement. Pt then stating that bowel movement did not alleviate pressure. EKG obtained.  Pt remained having increased work of breathing. VSS on 2L O2 via NC, norepinephrine infusion at 7 mcg/min. Hospitalist MD notified, orders obtained and followed.   "

## 2021-07-27 NOTE — DISCHARGE PLANNING
Anticipated Discharge Disposition:   Home when medically cleared    Action:   Chart review complete.     This patient is currently in the ICU and was transferred from [facility]. Patient is on special precautions. Patient is not medically cleared to discharge at this time.     1310: RN CM called patient's daughter Susan. Assessment complete. Susan has POA paperwork and will be emailing them to RN CM.     Barriers to Discharge:   Medical Clearance    Plan:   Follow up with medical clearance  Hospital care management will continue to assist with discharge planning needs.     Care Transition Team Assessment    Information Source  Orientation Level: Oriented X4  Information Given By: Other (Comments) child  Informant's Name: Susan Franklin  Who is responsible for making decisions for patient?: Patient    Readmission Evaluation  Is this a readmission?: No    Elopement Risk  Legal Hold: No  Ambulatory or Self Mobile in Wheelchair: No-Not an Elopement Risk  Disoriented: No  Psychiatric Symptoms: None  History of Wandering: No  Elopement this Admit: No  Vocalizing Wanting to Leave: No  Displays Behaviors, Body Language Wanting to Leave: No-Not at Risk for Elopement  Elopement Risk: Not at Risk for Elopement    Interdisciplinary Discharge Planning  Does Admitting Nurse Feel This Could be a Complex Discharge?: No  Primary Care Physician: none  Lives with - Patient's Self Care Capacity: Alone and Able to Care For Self  Patient or legal guardian wants to designate a caregiver: No  Support Systems: Children,   Housing / Facility: [address]  Do You Take your Prescribed Medications Regularly: Yes  Able to Return to Previous ADL's: Future Time w/Therapy  Mobility Issues: No  Prior Services: None  Patient Prefers to be Discharged to:: home  Assistance Needed: Yes  Durable Medical Equipment: Walker, Other - Specify (cane)    Discharge Preparedness  What is your plan after discharge?: Home with help  What  are your discharge supports?: Child  Prior Functional Level: Ambulatory, Independent with Activities of Daily Living, Drives Self    Functional Assesment  Prior Functional Level: Ambulatory, Independent with Activities of Daily Living, Drives Self    Finances  Financial Barriers to Discharge: No  Prescription Coverage: Yes    Vision / Hearing Impairment  Vision Impairment : Yes  Right Eye Vision: Wears Glasses  Left Eye Vision: Wears Glasses  Hearing Impairment : No    Advance Directive  Advance Directive?: DPOA for Health Care  Durable Power of  Name and Contact : Susan Franklin  (Susan to email documentation to RN CM )    Domestic Abuse  Have you ever been the victim of abuse or violence?: No  Physical Abuse or Sexual Abuse: No  Verbal Abuse or Emotional Abuse: No  Possible Abuse/Neglect Reported to:: Not Applicable    Psychological Assessment  History of Substance Abuse: None  History of Psychiatric Problems: No    Discharge Risks or Barriers  Discharge risks or barriers?: Complex medical needs  Patient risk factors: Complex medical needs, Vulnerable adult    Anticipated Discharge Information  Discharge Disposition: Still a Patient (30)         5

## 2021-07-27 NOTE — PROGRESS NOTES
0700-Report from Moses NICHOLSON. Plan of care reviewed. Patient currently sleeping. Does not appear to be in any distress or discomfort. All lines and drips verified with off going RN.     0800-Patient positioned for comfort. At this time does not report any pain. Levophed titrated according to MAP.

## 2021-07-27 NOTE — CARE PLAN
Problem: Hemodynamics  Goal: Patient's hemodynamics, fluid balance and neurologic status will be stable or improve  Outcome: Not Met   The patient is Watcher - Medium risk of patient condition declining or worsening    Shift Goals  Clinical Goals: hemodynamic stability, decrease in vasopressor support  Patient Goals: Stable to go home     Progress made toward(s) clinical / shift goals: Levophed titrated according to MAP    Patient is not progressing towards the following goals: Levophed increased this AM.       Problem: Respiratory  Goal: Patient will achieve/maintain optimum respiratory ventilation and gas exchange  Outcome: Progressing  Patient O2 decreased to 1L via NC

## 2021-07-27 NOTE — PROGRESS NOTES
Silvano from Lab called with critical result of WBC 39.4 at 0516. Critical lab result read back to Silvano.   Dr. Wagner notified of critical lab result at 0542.  Critical lab result read back by Dr. Wagner.

## 2021-07-27 NOTE — PROGRESS NOTES
Critical Care Progress Note    Date of admission  7/26/2021    Chief Complaint  89 y.o. female admitted 7/26/2021 with septic shock due to C. difficile pancolitis    Hospital Course  Very pleasant 89-year-old female with past medical history of pacemaker placement, hypertension, hyperlipidemia, hydrochlorothiazide, lisinopril, metoprolol, omeprazole, simvastatin transferred from [facility] in [place], [place] on 7/26/2021 for septic shock due to C. difficile pancolitis.  She presented there on 7/23 with complaints of weakness and diarrhea.     Prior to admission she had several days of soft normal colored stools without blood along with occasional nausea and nonbloody nonbilious emesis.  She was admitted and started on IV fluids and empiric antibiotics with vancomycin/Zosyn.  KARIS noted on admission.  She became hypotensive and febrile on hospital day #2, prompting labs showing a substantial leukocytosis and C. difficile positive stool.  CT abdomen/pelvis 7/25 revealed pancolitis without SBO or megacolon.  Diverticulosis without diverticulitis in the descending and sigmoid colon. IV antibiotics were discontinued and she was transitioned to oral vancomycin and IV Flagyl.  Blood cultures reportedly negative.  Hypotension was refractory to resuscitation she was started on IV vasopressors for hemodynamic support was subsequently transferred for higher level of care.     Labs this morning from outside hospital notable for WBC 34.9, hemoglobin 9.1, platelets 259.  20% bands.     Sodium 133, potassium 3.9, bicarb 21, creatinine 1.48     Stool PCR collected 7/25+ for C. difficile toxin A/P.  SARS-CoV-2 rapid JOSE negative.     Interval Problem Update  Chart review from the past 24 hours includes imaging, laboratory studies, vital signs and notes available.  Pertinent data for today's visit includes    No acute events overnight    Cardiac: levophed @ 3mcg/min -> 10mcg/min  Pulm: 3LPM NC ->  1LPM  Neuro: intact, speech slightly delayed  Heme: Leukocytosis WBC 34 -> 39, bandemia  ID:  PO vanco 500 q6H, IV Flagyl 500 Q8H  GI/endo: NPO, sips with meds, LR @100cc/hr  Labs/Imaging: AXR stable, 2 BM overnight, loose, distension stable  Lines: RIJ central line placed on admit 7/26/2021  Mobility: bedrest  Symptoms: distension    Review of Systems  Review of Systems   Constitutional: Negative for chills, fever and weight loss.   HENT: Negative for congestion, sinus pain and sore throat.    Eyes: Negative for pain and discharge.   Respiratory: Negative for cough, sputum production, shortness of breath, wheezing and stridor.    Cardiovascular: Negative for chest pain, orthopnea and leg swelling.   Gastrointestinal: Negative for abdominal pain, diarrhea, nausea and vomiting.        Distension   Genitourinary: Negative for dysuria, frequency and urgency.   Musculoskeletal: Negative for myalgias.   Skin: Negative for rash.   Neurological: Negative for dizziness, sensory change, focal weakness, loss of consciousness and headaches.   Psychiatric/Behavioral: Negative.    All other systems reviewed and are negative.    Vital Signs for last 24 hours   Temp:  [36.7 °C (98 °F)-37.5 °C (99.5 °F)] 37.5 °C (99.5 °F)  Pulse:  [56-70] 65  Resp:  [16-72] 27  BP: ()/(35-72) 100/44  SpO2:  [87 %-100 %] 97 %    Physical Exam   Physical Exam  Vitals and nursing note reviewed.   Constitutional:       General: She is not in acute distress.     Appearance: Normal appearance. She is well-developed. She is not diaphoretic.   Eyes:      General: No scleral icterus.        Right eye: No discharge.         Left eye: No discharge.      Conjunctiva/sclera: Conjunctivae normal.      Pupils: Pupils are equal, round, and reactive to light.   Neck:      Thyroid: No thyromegaly.      Vascular: No JVD.   Cardiovascular:      Rate and Rhythm: Normal rate and regular rhythm.      Heart sounds: Normal heart sounds. No murmur heard.   No gallop.     Pulmonary:      Effort: Pulmonary effort is normal. No respiratory distress.      Breath sounds: Normal breath sounds. No wheezing or rales.   Abdominal:      General: There is distension.      Palpations: Abdomen is soft.      Tenderness: There is abdominal tenderness. There is no guarding.      Comments: Decreased BS   Musculoskeletal:         General: No tenderness.   Lymphadenopathy:      Cervical: No cervical adenopathy.   Skin:     General: Skin is warm.      Capillary Refill: Capillary refill takes less than 2 seconds.      Findings: No erythema or rash.   Neurological:      General: No focal deficit present.      Mental Status: She is alert and oriented to person, place, and time.      Cranial Nerves: No cranial nerve deficit.      Sensory: No sensory deficit.      Motor: No abnormal muscle tone.   Psychiatric:         Behavior: Behavior normal.       Medications  Current Facility-Administered Medications   Medication Dose Route Frequency Provider Last Rate Last Admin   • lactated ringers infusion   Intravenous Continuous Mp Forde M.D. 100 mL/hr at 07/27/21 0222 New Bag at 07/27/21 0222   • enoxaparin (LOVENOX) inj 40 mg  40 mg Subcutaneous DAILY Mp Forde M.D.   40 mg at 07/27/21 0533   • ondansetron (ZOFRAN) syringe/vial injection 4 mg  4 mg Intravenous Q4HRS PRN Mp Forde M.D.   4 mg at 07/27/21 0608   • ondansetron (ZOFRAN ODT) dispertab 4 mg  4 mg Oral Q4HRS PRN Mp Forde M.D.       • omeprazole (PRILOSEC) capsule 20 mg  20 mg Oral DAILY Mp Forde M.D.   20 mg at 07/27/21 0533   • simvastatin (ZOCOR) tablet 10 mg  10 mg Oral Q EVENING Mp Forde M.D.   10 mg at 07/26/21 1715   • vancomycin 50 mg/mL oral soln 500 mg  500 mg Oral Q6HRS Mp Forde M.D.   500 mg at 07/27/21 0533    And   • metroNIDAZOLE (FLAGYL) IVPB 500 mg  500 mg Intravenous Q8HRS Mp Forde M.D.   Stopped at 07/27/21 0634   • norepinephrine (Levophed) 8 mg in 250 mL NS  infusion (premix)  0.5-30 mcg/min Intravenous Continuous Mp Forde M.D. 18.8 mL/hr at 07/27/21 0819 10 mcg/min at 07/27/21 0819    And   • vasopressin (VASOSTRICT) 20 Units in  mL Infusion  0.03 Units/min Intravenous Continuous Mp Forde M.D.   Dose not Required at 07/26/21 1700   • acetaminophen (Tylenol) tablet 650 mg  650 mg Oral Q6HRS PRN Mp Forde M.D.   650 mg at 07/26/21 1753       Fluids    Intake/Output Summary (Last 24 hours) at 7/27/2021 0845  Last data filed at 7/27/2021 0800  Gross per 24 hour   Intake 1994.54 ml   Output 830 ml   Net 1164.54 ml       Laboratory          Recent Labs     07/26/21  1520 07/27/21  0350   SODIUM 128* 129*   POTASSIUM 4.7 4.8   CHLORIDE 99 101   CO2 20 17*   BUN 17 17   CREATININE 1.27 1.08   MAGNESIUM 1.7 1.5   PHOSPHORUS 2.5 2.7   CALCIUM 8.6 8.7     Recent Labs     07/26/21  1520 07/27/21  0350   ALTSGPT 10 8   ASTSGOT 9* 7*   ALKPHOSPHAT 85 128*   TBILIRUBIN 0.2 0.2   GLUCOSE 100* 99     Recent Labs     07/26/21  1520 07/27/21  0350   WBC 34.1* 39.4*   NEUTSPOLYS 91.00* 89.00*   LYMPHOCYTES 2.00* 2.30*   MONOCYTES 6.00 4.70   EOSINOPHILS 1.00 0.20   BASOPHILS 0.00 0.60   ASTSGOT 9* 7*   ALTSGPT 10 8   ALKPHOSPHAT 85 128*   TBILIRUBIN 0.2 0.2     Recent Labs     07/26/21  1520 07/27/21  0350   RBC 3.08* 3.07*   HEMOGLOBIN 9.7* 9.5*   HEMATOCRIT 28.8* 28.7*   PLATELETCT 285 340   PROTHROMBTM 16.4*  --    INR 1.43*  --        Imaging  X-Ray:  I have personally reviewed the images and compared with prior images.  Abdominal plain film unchanged  Requested imaging be pushed from referring hospital for our review    Assessment/Plan  * Septic shock due to Clostridioides difficile (HCC)  Assessment & Plan  This is Septic shock Present on admission  SIRS criteria identified on my evaluation include: Tachycardia, with heart rate greater than 90 BPM and Bandemia, greater than 10% bands  Source is c diff pancolitis  Presentation includes: Severe sepsis  present and persistent hypotension after 30 ml/kg completed.   Despite appropriate fluid resuscitation with crystalloid given per sepsis guidelines, the patient remains hypotensive with systolic blood pressure less than 90 or MAP less than 65  Hemodynamic support with additional fluids and IV vasopressors as needed to maintain a SBP of 90 or MAP of 65  IV antibiotics as appropriate for source of sepsis  Reassessment: I have reassessed the patient's hemodynamic status    Worsening vasopressor requirements, uptrending WBC now 39k  Clinical exam stable, adequately resuscitated on bedside ultrasound exam, lactate and KARIS stable/improving  ID and general surgery consulted. Reviewed current condition with pt and son Leo, risks/benefits/alternatives discussed at length, pt has decided to hold off on surgery at this time given high morbidity/mortality. If shows clinical decline, pt open to surgery/bowel resection if necessary for preservation of life  Cont LR @100 cc/hr, NPO  ATLAS 6 = severe C diff. Cont PO vanco/IV Flagyl.   Cont to trend lactate BID    Hyponatremia  Assessment & Plan  Due to sepsis/SIADH  Cont LR mIVF  Plan of care as outlined above.    KARIS (acute kidney injury) (HCC)  Assessment & Plan  Stable  Strict IOs  Avoid nephrotoxins    History of pacemaker  Assessment & Plan  AV pacer wires without AICD  Currently V paced    Pancolitis (HCC)  Assessment & Plan  Due to cdiff colitis  Images from outside hospital have been requested  Cont daily KUB  Plan of care as outlined above.    Full code status  Assessment & Plan  Confirmed with patient 7/26/2021    VTE:  Lovenox  Ulcer: Not Indicated  Lines: Central Line  Ongoing indication addressed and Andrade Catheter  Ongoing indication addressed    I have performed a physical exam and reviewed and updated ROS and Plan today (7/27/2021). In review of yesterday's note (7/26/2021), there are no changes except as documented above.     Discussed patient condition and risk  of morbidity and/or mortality with Family, RN, RT, Pharmacy, Patient and general surgery and infectious disease     The patient remains critically ill.  Critical care time = 90 minutes in directly providing and coordinating critical care and extensive data review.  No time overlap and excludes procedures.    This note was generated using voice recognition software which has a chance of producing errors of grammar and content.  I have made every reasonable attempt to find and correct any errors, but it should be expected that some may not be found prior to finalization of this note.  __________  Mp Forde MD  Pulmonary and Critical Care Medicine  [facility]

## 2021-07-27 NOTE — CARE PLAN
The patient is Watcher - Medium risk of patient condition declining or worsening    Shift Goals  Clinical Goals: hemodynamic stability  Patient Goals: Stable to go home     Progress made toward(s) clinical / shift goals:    Problem: Knowledge Deficit - Standard  Goal: Patient and family/care givers will demonstrate understanding of plan of care, disease process/condition, diagnostic tests and medications  Outcome: Progressing     Problem: Fall Risk  Goal: Patient will remain free from falls  Outcome: Progressing     Problem: Hemodynamics  Goal: Patient's hemodynamics, fluid balance and neurologic status will be stable or improve  Outcome: Progressing     Problem: Fluid Volume  Goal: Fluid volume balance will be maintained  Outcome: Progressing

## 2021-07-27 NOTE — PROGRESS NOTES
Received report from LYN Vega. Assumed pt care. Verified lines and gtts. Pt resting in bed, respirations even and unlabored, pt having complaints of abdominal pain with coughing, but no pain at rest. Encouraged pt to splint abdomen with pillow when coughing. Pt needs met at this time. All safety precautions in place.

## 2021-07-27 NOTE — CONSULTS
INFECTIOUS DISEASES INPATIENT CONSULT NOTE     Date of Service: 7/27/2021    Consult Requested By: Mp Forde M.D.    Reason for Consultation: Septic shock, clostridiodes difficile colitis    History of Present Illness:   Cesia Franklin is a 89 y.o. elderly woman with a history of pacemaker placement, hypertension, and hyperlipidemia admitted 7/26/2021 from [facility] in [place], [place] secondary to septic shock due to C. difficile pancolitis.  She initially presented there on 7/23 secondary to weakness and diarrhea.  Patient states that she was in her usual state of health until 2 days prior to admission at the outside hospital when she developed brown pasty stools.  She denied any hematochezia.  She states she had right total knee arthroplasty in April and had antibiotics at that time but does not think she had antibiotics recently.  However approximately 2 weeks prior to admission, the patient states that she had an appointment with her primary care physician and she was started on a pill.  She is unclear if this was an antibiotic.  She has had had intermittent nausea and nonbloody nonbilious emesis.  No fevers or chills.  She was started empirically on antibiotics.  Her hospital course was complicated by fever and hypotension prompting labs which showed significant leukocytosis and a positive C. difficile PCR.  CT scan of the abdomen pelvis on 7/25 revealed pancolitis without any evidence of small bowel obstruction or megacolon.  IV antibiotics were discontinued and she was transitioned to oral vancomycin and IV Flagyl.  Blood cultures were also reportedly negative.  She was transferred to [facility] for higher level of care given septic shock.  On presentation here, she was initially afebrile however she did spike a fever with a T-max of 100.4 with a leukocytosis of 34.1.  She was started on norepinephrine.  She was also continued on vancomycin 500 mg every 6 hours and IV  Flagyl.  Lactic acid was normal.  She did have evidence of mild KARIS with a creatinine of 1.27.  Abdominal x-ray shows possible colonic wall thickening in the right lower quadrant and left lower quadrant.  LFTs are within normal limits.  Despite appropriate antimicrobial therapy, continues to have low-grade fevers, T-max of 100.4 today and increasing leukocytosis of 39.4.  Plan was for patient to undergo surgery today however patient has opted to continue another day of IV antibiotics and monitor.  Blood cultures x2 on  are negative to date.  Infectious disease service consulted for further recommendations.      All other review of systems reviewed and negative except those documented above in the HPI.     PMH:   Past Medical History:   Diagnosis Date   • Anxiety     pt takes xanax for panic attacks   • Arrhythmia    • Cough    • Hypertension    • Pacemaker     cardiologist Dr Nadir Adler        PSH:  History reviewed. No pertinent surgical history.    FAMILY HX:  History reviewed. No pertinent family history.    SOCIAL HX:  Social History     Socioeconomic History   • Marital status:      Spouse name: Not on file   • Number of children: Not on file   • Years of education: Not on file   • Highest education level: Not on file   Occupational History   • Not on file   Tobacco Use   • Smoking status: Former Smoker     Quit date:      Years since quittin.5   • Smokeless tobacco: Never Used   Vaping Use   • Vaping Use: Never used   Substance and Sexual Activity   • Alcohol use: Yes     Alcohol/week: 1.8 oz     Types: 3 Shots of liquor per week   • Drug use: Never   • Sexual activity: Not on file   Other Topics Concern   • Not on file   Social History Narrative   • Not on file     Social Determinants of Health     Financial Resource Strain:    • Difficulty of Paying Living Expenses:    Food Insecurity:    • Worried About Running Out of Food in the Last Year:    • Ran Out of Food in the Last Year:     Transportation Needs:    • Lack of Transportation (Medical):    • Lack of Transportation (Non-Medical):    Physical Activity:    • Days of Exercise per Week:    • Minutes of Exercise per Session:    Stress:    • Feeling of Stress :    Social Connections:    • Frequency of Communication with Friends and Family:    • Frequency of Social Gatherings with Friends and Family:    • Attends Congregational Services:    • Active Member of Clubs or Organizations:    • Attends Club or Organization Meetings:    • Marital Status:    Intimate Partner Violence:    • Fear of Current or Ex-Partner:    • Emotionally Abused:    • Physically Abused:    • Sexually Abused:      Social History     Tobacco Use   Smoking Status Former Smoker   • Quit date:    • Years since quittin.5   Smokeless Tobacco Never Used     Social History     Substance and Sexual Activity   Alcohol Use Yes   • Alcohol/week: 1.8 oz   • Types: 3 Shots of liquor per week       Allergies/Intolerances:  No Known Allergies    History reviewed with the patient     Other Current Medications:    Current Facility-Administered Medications:   •  lactated ringers infusion, , Intravenous, Continuous, Mp Forde M.D., Last Rate: 100 mL/hr at 21, New Bag at 21  •  enoxaparin (LOVENOX) inj 40 mg, 40 mg, Subcutaneous, DAILY, Mp Forde M.D., 40 mg at 21 0533  •  ondansetron (ZOFRAN) syringe/vial injection 4 mg, 4 mg, Intravenous, Q4HRS PRN, Mp Forde M.D., 4 mg at 21 0608  •  ondansetron (ZOFRAN ODT) dispertab 4 mg, 4 mg, Oral, Q4HRS PRN, Mp Forde M.D.  •  simvastatin (ZOCOR) tablet 10 mg, 10 mg, Oral, Q EVENING, Mp Forde M.D., 10 mg at 21 1715  •  vancomycin 50 mg/mL oral soln 500 mg, 500 mg, Oral, Q6HRS, 500 mg at 21 0533 **AND** metroNIDAZOLE (FLAGYL) IVPB 500 mg, 500 mg, Intravenous, Q8HRS, Mp Forde M.D., Stopped at 21 0634  •  norepinephrine (Levophed) 8 mg in 250 mL NS  "infusion (premix), 0.5-30 mcg/min, Intravenous, Continuous, Last Rate: 18.8 mL/hr at 21 0819, 10 mcg/min at 21 0819 **AND** vasopressin (VASOSTRICT) 20 Units in  mL Infusion, 0.03 Units/min, Intravenous, Continuous, Dose not Required at 21 1700 **AND** Notify physician if MAP remains less than 65 mmHg after 15 minutes of Norepinephrine and Vasopressin initiation, , , Once **AND** Initial Vasopressor Therapy for Septic Shock, , , CONTINUOUS, Mp Forde M.D.  •  acetaminophen (Tylenol) tablet 650 mg, 650 mg, Oral, Q6HRS PRN, Mp Forde M.D., 650 mg at 21 3634  [unfilled]    Most Recent Vital Signs:  /44   Pulse 65   Temp 37.5 °C (99.5 °F) (Bladder)   Resp (!) 27   Ht 1.626 m (5' 4\")   Wt 73.4 kg (161 lb 13.1 oz)   SpO2 97%   BMI 27.78 kg/m²   Temp  Av °C (98.6 °F)  Min: 36.7 °C (98 °F)  Max: 37.5 °C (99.5 °F)    Physical Exam:  General: Elderly, well nourished, no diaphoresis, ill-appearing, no acute distress  HEENT: sclera anicteric, PERRL, extraocular muscles intact, mucous membranes moist, oropharynx clear and moist, no oral lesions or exudate  Neck: supple, no lymphadenopathy.  Right IJ catheter in place-nontender, no surrounding erythema  Chest: CTAB, no rales, rhonchi or wheezes, normal work of breathing.  Cardiac: regular rate and rhythm, normal S1 S2, no murmurs, rubs or gallops  Abdomen: Diminished bowel sounds, soft, nondistended, minimal tenderness to palpation.    Extremities: WWP, no edema, 2+ pedal pulses  Skin: warm and dry, no rashes or worrisome lesions  Neuro: Alert and oriented times 3, non-focal exam, speech fluent, full range of motion to bilateral upper and lower extremities  Psych: normal mood and behavior, pleasant; memory intact, normal judgement    Pertinent Lab Results:  Recent Labs     21  1520 21  0350   WBC 34.1* 39.4*      Recent Labs     21  1520 21  0350   HEMOGLOBIN 9.7* 9.5*   HEMATOCRIT " "28.8* 28.7*   MCV 93.5 93.5   MCH 31.5 30.9   PLATELETCT 285 340         Recent Labs     07/26/21  1520 07/27/21  0350   SODIUM 128* 129*   POTASSIUM 4.7 4.8   CHLORIDE 99 101   CO2 20 17*   CREATININE 1.27 1.08        Recent Labs     07/26/21  1520 07/27/21  0350   ALBUMIN 2.5* 2.4*        Pertinent Micro:  Results     Procedure Component Value Units Date/Time    Blood Culture [[id number]] Collected: 07/26/21 1520    Order Status: Completed Specimen: Blood from Peripheral Updated: 07/27/21 0708     Significant Indicator NEG     Source BLD     Site PERIPHERAL     Culture Result No Growth  Note: Blood cultures are incubated for 5 days and  are monitored continuously.Positive blood cultures  are called to the RN and reported as soon as  they are identified.  Blood culture testing and Gram stain, if indicated, are  performed at [facility], [address] [place] [address].  Positive blood cultures are  sent to [facility], [address] [place] [address], for organism identification and  susceptibility testing.      Narrative:      From different peripheral sites, if not done within the last  24 hours (Per Hospital Policy: Only change specimen source to  \"Line\" if specified by physician order)  Right AC    Blood Culture [[id number]] Collected: 07/26/21 1520    Order Status: Completed Specimen: Blood from Peripheral Updated: 07/27/21 0708     Significant Indicator NEG     Source BLD     Site PERIPHERAL     Culture Result No Growth  Note: Blood cultures are incubated for 5 days and  are monitored continuously.Positive blood cultures  are called to the RN and reported as soon as  they are identified.  Blood culture testing and Gram stain, if indicated, are  performed at [facility], [facility]  [facility], [place].  Positive blood cultures are  sent to [facility], [address] [place] [address], for organism identification " "and  susceptibility testing.      Narrative:      From different peripheral sites, if not done within the last  24 hours (Per Hospital Policy: Only change specimen source to  \"Line\" if specified by physician order)  Left AC        No results found for: BLOODCULTU, BLDCULT, BCHOLD     Studies:  [id number] VIEW    Result Date: 7/26/2021 7/26/2021 3:28 PM HISTORY/REASON FOR EXAM:  Lower abdominal pain. TECHNIQUE/EXAM DESCRIPTION AND NUMBER OF VIEWS:  1 view(s) of the abdomen. COMPARISON: None FINDINGS: The bowel gas pattern is nonobstructive. There are air-filled loops of colon with questionable areas of wall thickening in the right lower quadrant and left lower quadrants. There is no free air. Ill-defined calcification in the left lower quadrant may represent a calcified uterine fibroid. There is a right lower quadrant phlebolith. Visualized chest demonstrates cardiac leads projecting over the cardiac silhouette. Lung bases are clear. There are degenerative changes in the spine and hips.     1.  Bowel gas pattern is nonobstructive. 2.  There is a question of colonic wall thickening in the right lower quadrant and left lower quadrant. This is nonspecific. This could be due to inflammation or infection. 3.  There is a left hemipelvic calcification, possibly a calcified uterine fibroid.    DX-CHEST-FOR LINE PLACEMENT Perform procedure in: Patient's Room    Result Date: 7/26/2021 7/26/2021 3:51 PM HISTORY/REASON FOR EXAM: Central Line Placement. TECHNIQUE/EXAM DESCRIPTION AND NUMBER OF VIEWS: Single portable view of the chest. COMPARISON: None. FINDINGS: Right internal jugular line tip overlies the cavoatrial junction Left transsubclavian pacer is present. The generator obscures underlying structures. Cardiomediastinal contours are notable for mild aortic ectasia and atherosclerosis Lungs demonstrate mild hazy opacity especially at the costophrenic sulci No pneumothorax is seen.     No pneumothorax identified " following right IJ line placement Mild hazy opacity is concerning for interstitial edema Mild aortic ectasia      IMPRESSION:   1.  Septic shock    2.  Clostridiodes difficile colitis  3.  Acute kidney injury  4.  Leukocytosis      PLAN:   Cesia Franklin is a 89 y.o. elderly woman with a history of hyperlipidemia, hypertension and pacemaker in place admitted from an outside hospital secondary to weakness and diarrhea.  Outside hospital CT scan revealed pancolitis in the setting of positive C. difficile PCR.  Her outside hospital course was complicated by septic shock for which she was transferred to [facility] for higher level care and started on pressors on 7/26.  She is currently on oral vancomycin 500 mg every 6 hours and IV Flagyl.  Her white count increased today.    -Agree with general surgery evaluation today.  Plan for possible surgery on 7/28 if clinical deterioration.  Patient opted to postpone surgery which was initially scheduled this evening and to continue IV antibiotics  -Continue oral vancomycin 500 mg every 6 hours and IV Flagyl 500 mg every 8 hours  -Monitor closely for clinical deterioration      Plan of care discussed with intensivist Mp Forde M.D. and bedside nurse. Will continue to follow    Amee Hyde M.D.      Please note that this dictation was created using voice recognition software. I have worked with technical experts from [facility] to optimize the interface.  I have made every reasonable attempt to correct obvious errors, but there may be errors of grammar and possibly content that I did not discover before finalizing the note.

## 2021-07-27 NOTE — CARE PLAN
The patient is Watcher - Medium risk of patient condition declining or worsening    Shift Goals  Clinical Goals: hemodynamic stability  Patient Goals: Stable to go home     Progress made toward(s) clinical / shift goals:  Blood pressures labile, however, maintaining MAP >65 and producing urinary output greater than 0.5 mL/kg/hr of urine.    Patient is not progressing towards the following goals:      Problem: Knowledge Deficit - Standard  Goal: Patient and family/care givers will demonstrate understanding of plan of care, disease process/condition, diagnostic tests and medications  Outcome: Progressing     Problem: Fall Risk  Goal: Patient will remain free from falls  Outcome: Progressing     Problem: Hemodynamics  Goal: Patient's hemodynamics, fluid balance and neurologic status will be stable or improve  Outcome: Progressing     Problem: Fluid Volume  Goal: Fluid volume balance will be maintained  Outcome: Progressing     Problem: Urinary - Renal Perfusion  Goal: Ability to achieve and maintain adequate renal perfusion and functioning will improve  Outcome: Progressing     Problem: Respiratory  Goal: Patient will achieve/maintain optimum respiratory ventilation and gas exchange  Outcome: Progressing     Problem: Mechanical Ventilation  Goal: Safe management of artificial airway and ventilation  Outcome: Progressing  Goal: Successful weaning off mechanical ventilator, spontaneously maintains adequate gas exchange  Outcome: Progressing  Goal: Patient will be able to express needs and understand communication  Outcome: Progressing     Problem: Physical Regulation  Goal: Diagnostic test results will improve  Outcome: Progressing  Goal: Signs and symptoms of infection will decrease  Outcome: Progressing

## 2021-07-28 ENCOUNTER — APPOINTMENT (OUTPATIENT)
Dept: RADIOLOGY | Facility: MEDICAL CENTER | Age: 86
DRG: 871 | End: 2021-07-28
Attending: INTERNAL MEDICINE
Payer: MEDICARE

## 2021-07-28 LAB
ALBUMIN SERPL BCP-MCNC: 2.1 G/DL (ref 3.2–4.9)
ALBUMIN/GLOB SERPL: 1 G/DL
ALP SERPL-CCNC: 72 U/L (ref 30–99)
ALT SERPL-CCNC: 7 U/L (ref 2–50)
ANION GAP SERPL CALC-SCNC: 11 MMOL/L (ref 7–16)
AST SERPL-CCNC: 5 U/L (ref 12–45)
BASOPHILS # BLD AUTO: 0.5 % (ref 0–1.8)
BASOPHILS # BLD: 0.1 K/UL (ref 0–0.12)
BILIRUB SERPL-MCNC: 0.2 MG/DL (ref 0.1–1.5)
BUN SERPL-MCNC: 15 MG/DL (ref 8–22)
CALCIUM SERPL-MCNC: 8.8 MG/DL (ref 8.4–10.2)
CHLORIDE SERPL-SCNC: 105 MMOL/L (ref 96–112)
CO2 SERPL-SCNC: 18 MMOL/L (ref 20–33)
CREAT SERPL-MCNC: 0.9 MG/DL (ref 0.5–1.4)
EOSINOPHIL # BLD AUTO: 0.19 K/UL (ref 0–0.51)
EOSINOPHIL NFR BLD: 0.9 % (ref 0–6.9)
ERYTHROCYTE [DISTWIDTH] IN BLOOD BY AUTOMATED COUNT: 46.8 FL (ref 35.9–50)
GLOBULIN SER CALC-MCNC: 2.1 G/DL (ref 1.9–3.5)
GLUCOSE SERPL-MCNC: 81 MG/DL (ref 65–99)
HCT VFR BLD AUTO: 28.4 % (ref 37–47)
HGB BLD-MCNC: 9.2 G/DL (ref 12–16)
IMM GRANULOCYTES # BLD AUTO: 0.51 K/UL (ref 0–0.11)
IMM GRANULOCYTES NFR BLD AUTO: 2.4 % (ref 0–0.9)
LACTATE BLD-SCNC: 1.2 MMOL/L (ref 0.5–2)
LYMPHOCYTES # BLD AUTO: 1.03 K/UL (ref 1–4.8)
LYMPHOCYTES NFR BLD: 4.8 % (ref 22–41)
MAGNESIUM SERPL-MCNC: 1.4 MG/DL (ref 1.5–2.5)
MCH RBC QN AUTO: 30.8 PG (ref 27–33)
MCHC RBC AUTO-ENTMCNC: 32.4 G/DL (ref 33.6–35)
MCV RBC AUTO: 95 FL (ref 81.4–97.8)
MONOCYTES # BLD AUTO: 1.12 K/UL (ref 0–0.85)
MONOCYTES NFR BLD AUTO: 5.3 % (ref 0–13.4)
NEUTROPHILS # BLD AUTO: 18.3 K/UL (ref 2–7.15)
NEUTROPHILS NFR BLD: 86.1 % (ref 44–72)
NRBC # BLD AUTO: 0 K/UL
NRBC BLD-RTO: 0 /100 WBC
PHOSPHATE SERPL-MCNC: 2.7 MG/DL (ref 2.5–4.5)
PLATELET # BLD AUTO: 309 K/UL (ref 164–446)
PMV BLD AUTO: 9.5 FL (ref 9–12.9)
POTASSIUM SERPL-SCNC: 4.8 MMOL/L (ref 3.6–5.5)
PROT SERPL-MCNC: 4.2 G/DL (ref 6–8.2)
RBC # BLD AUTO: 2.99 M/UL (ref 4.2–5.4)
SODIUM SERPL-SCNC: 134 MMOL/L (ref 135–145)
WBC # BLD AUTO: 21.3 K/UL (ref 4.8–10.8)

## 2021-07-28 PROCEDURE — 85025 COMPLETE CBC W/AUTO DIFF WBC: CPT

## 2021-07-28 PROCEDURE — 700111 HCHG RX REV CODE 636 W/ 250 OVERRIDE (IP): Performed by: INTERNAL MEDICINE

## 2021-07-28 PROCEDURE — 700102 HCHG RX REV CODE 250 W/ 637 OVERRIDE(OP): Performed by: INTERNAL MEDICINE

## 2021-07-28 PROCEDURE — 83735 ASSAY OF MAGNESIUM: CPT

## 2021-07-28 PROCEDURE — 97162 PT EVAL MOD COMPLEX 30 MIN: CPT

## 2021-07-28 PROCEDURE — 700105 HCHG RX REV CODE 258: Performed by: INTERNAL MEDICINE

## 2021-07-28 PROCEDURE — 80053 COMPREHEN METABOLIC PANEL: CPT

## 2021-07-28 PROCEDURE — 770020 HCHG ROOM/CARE - TELE (206)

## 2021-07-28 PROCEDURE — A9270 NON-COVERED ITEM OR SERVICE: HCPCS | Performed by: INTERNAL MEDICINE

## 2021-07-28 PROCEDURE — 99291 CRITICAL CARE FIRST HOUR: CPT | Performed by: INTERNAL MEDICINE

## 2021-07-28 PROCEDURE — 94640 AIRWAY INHALATION TREATMENT: CPT

## 2021-07-28 PROCEDURE — 99232 SBSQ HOSP IP/OBS MODERATE 35: CPT | Performed by: INTERNAL MEDICINE

## 2021-07-28 PROCEDURE — 700101 HCHG RX REV CODE 250: Performed by: INTERNAL MEDICINE

## 2021-07-28 PROCEDURE — 94760 N-INVAS EAR/PLS OXIMETRY 1: CPT

## 2021-07-28 PROCEDURE — 84100 ASSAY OF PHOSPHORUS: CPT

## 2021-07-28 PROCEDURE — 74018 RADEX ABDOMEN 1 VIEW: CPT

## 2021-07-28 PROCEDURE — 83605 ASSAY OF LACTIC ACID: CPT

## 2021-07-28 RX ORDER — ALPRAZOLAM 0.25 MG/1
0.12 TABLET ORAL
Status: DISCONTINUED | OUTPATIENT
Start: 2021-07-28 | End: 2021-08-02 | Stop reason: HOSPADM

## 2021-07-28 RX ORDER — HEPARIN SODIUM 5000 [USP'U]/ML
5000 INJECTION, SOLUTION INTRAVENOUS; SUBCUTANEOUS EVERY 12 HOURS
Status: DISCONTINUED | OUTPATIENT
Start: 2021-07-28 | End: 2021-08-02 | Stop reason: HOSPADM

## 2021-07-28 RX ORDER — MAGNESIUM SULFATE HEPTAHYDRATE 40 MG/ML
4 INJECTION, SOLUTION INTRAVENOUS ONCE
Status: COMPLETED | OUTPATIENT
Start: 2021-07-28 | End: 2021-07-28

## 2021-07-28 RX ORDER — IPRATROPIUM BROMIDE AND ALBUTEROL SULFATE 2.5; .5 MG/3ML; MG/3ML
3 SOLUTION RESPIRATORY (INHALATION)
Status: DISCONTINUED | OUTPATIENT
Start: 2021-07-28 | End: 2021-08-02 | Stop reason: HOSPADM

## 2021-07-28 RX ADMIN — VANCOMYCIN HYDROCHLORIDE 500 MG: KIT ORAL at 05:45

## 2021-07-28 RX ADMIN — SODIUM CHLORIDE, POTASSIUM CHLORIDE, SODIUM LACTATE AND CALCIUM CHLORIDE: 600; 310; 30; 20 INJECTION, SOLUTION INTRAVENOUS at 16:31

## 2021-07-28 RX ADMIN — METRONIDAZOLE 500 MG: 500 INJECTION, SOLUTION INTRAVENOUS at 22:05

## 2021-07-28 RX ADMIN — SIMVASTATIN 10 MG: 10 TABLET, FILM COATED ORAL at 17:17

## 2021-07-28 RX ADMIN — IPRATROPIUM BROMIDE AND ALBUTEROL SULFATE 3 ML: .5; 2.5 SOLUTION RESPIRATORY (INHALATION) at 10:49

## 2021-07-28 RX ADMIN — ACETAMINOPHEN 650 MG: 325 TABLET, FILM COATED ORAL at 13:42

## 2021-07-28 RX ADMIN — VANCOMYCIN HYDROCHLORIDE 500 MG: KIT ORAL at 11:32

## 2021-07-28 RX ADMIN — VANCOMYCIN HYDROCHLORIDE 500 MG: KIT ORAL at 00:12

## 2021-07-28 RX ADMIN — METRONIDAZOLE 500 MG: 500 INJECTION, SOLUTION INTRAVENOUS at 13:42

## 2021-07-28 RX ADMIN — METRONIDAZOLE 500 MG: 500 INJECTION, SOLUTION INTRAVENOUS at 05:45

## 2021-07-28 RX ADMIN — HEPARIN SODIUM 5000 UNITS: 5000 INJECTION, SOLUTION INTRAVENOUS; SUBCUTANEOUS at 17:17

## 2021-07-28 RX ADMIN — SODIUM CHLORIDE, POTASSIUM CHLORIDE, SODIUM LACTATE AND CALCIUM CHLORIDE: 600; 310; 30; 20 INJECTION, SOLUTION INTRAVENOUS at 08:14

## 2021-07-28 RX ADMIN — VANCOMYCIN HYDROCHLORIDE 500 MG: KIT ORAL at 17:17

## 2021-07-28 RX ADMIN — MAGNESIUM SULFATE IN WATER 4 G: 40 INJECTION, SOLUTION INTRAVENOUS at 08:53

## 2021-07-28 RX ADMIN — HEPARIN SODIUM 5000 UNITS: 5000 INJECTION, SOLUTION INTRAVENOUS; SUBCUTANEOUS at 08:53

## 2021-07-28 RX ADMIN — VANCOMYCIN HYDROCHLORIDE 500 MG: KIT ORAL at 23:41

## 2021-07-28 ASSESSMENT — ENCOUNTER SYMPTOMS
ORTHOPNEA: 0
VOMITING: 0
PSYCHIATRIC NEGATIVE: 1
DIARRHEA: 0
FEVER: 0
WHEEZING: 0
ROS GI COMMENTS: DISTENSION
MYALGIAS: 0
WEIGHT LOSS: 0
DIZZINESS: 0
LOSS OF CONSCIOUSNESS: 0
SINUS PAIN: 0
NAUSEA: 0
SHORTNESS OF BREATH: 0
FOCAL WEAKNESS: 0
SENSORY CHANGE: 0
EYE DISCHARGE: 0
SPUTUM PRODUCTION: 0
SORE THROAT: 0
EYE PAIN: 0
CHILLS: 0
HEADACHES: 0
ABDOMINAL PAIN: 0
DIARRHEA: 1
STRIDOR: 0
COUGH: 0

## 2021-07-28 ASSESSMENT — COGNITIVE AND FUNCTIONAL STATUS - GENERAL
TURNING FROM BACK TO SIDE WHILE IN FLAT BAD: A LOT
STANDING UP FROM CHAIR USING ARMS: A LITTLE
MOBILITY SCORE: 13
MOVING FROM LYING ON BACK TO SITTING ON SIDE OF FLAT BED: A LOT
WALKING IN HOSPITAL ROOM: A LOT
SUGGESTED CMS G CODE MODIFIER MOBILITY: CL
MOVING TO AND FROM BED TO CHAIR: A LOT
CLIMB 3 TO 5 STEPS WITH RAILING: A LOT

## 2021-07-28 ASSESSMENT — PAIN DESCRIPTION - PAIN TYPE
TYPE: ACUTE PAIN

## 2021-07-28 ASSESSMENT — FIBROSIS 4 INDEX: FIB4 SCORE: 0.66

## 2021-07-28 ASSESSMENT — GAIT ASSESSMENTS
ASSISTIVE DEVICE: FRONT WHEEL WALKER
GAIT LEVEL OF ASSIST: MINIMAL ASSIST
DISTANCE (FEET): 10
DEVIATION: BRADYKINETIC;SHUFFLED GAIT;STEP TO

## 2021-07-28 NOTE — CONSULTS
DATE OF SERVICE:  07/27/2021     SURGERY CONSULTATION     HISTORY OF PRESENT ILLNESS:  The patient is an 89-year-old woman who I have   been asked to evaluate further for C. diff and associated sepsis.  She was   admitted on 07/26/2021 in transfer with the diagnosis of C. diff pancolitis   and associated septic shock.  She has been resuscitated aggressively, has been   receiving IV antibiotics and has been maintained on pressor support.  She   continues to have loose bowel movements.  At the time of my exam, she states   that she currently has no abdominal pain and overall is feeling better.  She   states that she is still having bowel movements but apparently there is a   little more firm than they were previously.  She states that she began feeling   ill on 07/23/2021 with severe diarrhea and associated weakness.  She has not   had anything similar to this in the past.     PAST MEDICAL HISTORY:  Significant for hypertension, dyslipidemia, arrhythmias   requiring a pacemaker and anxiety.     PAST SURGICAL HISTORY:  Pacemaker placement.     MEDICATIONS:  Prior to admission include hydrochlorothiazide, lisinopril,   metoprolol, omeprazole and simvastatin.     ALLERGIES:  She has no stated drug allergies.     SOCIAL HISTORY:  She quit smoking many years ago.  She drinks minimally.  She   does not use illicit drugs.     FAMILY HISTORY:  Essentially negative.     REVIEW OF SYSTEMS:  Baseline state of health until the onset of diarrhea and   associated weakness in approximately 5 days ago as per HPI; otherwise,   negative per AMA and CMS criteria.     PHYSICAL EXAMINATION:  VITAL SIGNS:  Here, she currently has a temperature of 37.3, pulse of 65,   blood pressure is 130/66.  Her Levophed has been weaned from 10 earlier today   to 2 currently and is in the process of being weaned further.  GENERAL:  She is lying in bed and is in no distress.  HEENT:  Unremarkable.  Pupils are equal.  Oropharynx without lesions.  NECK:   Supple.  LUNGS:  Clear.  CARDIOVASCULAR:  Reveals regular rate and rhythm.  ABDOMEN:  Soft and essentially completely nontender to my examination.  EXTREMITIES:  Symmetric without clubbing, cyanosis or edema.  SKIN:  Warm and dry.  She has palpable radial and femoral pulses.  NEUROLOGIC:  She is neurologically intact without any grossly detectable motor   or sensory deficits.     LABORATORY DATA:  Includes a white count of 32.9, hematocrit of 29.5,   platelets of 332.  She does have a shift with 89% neutrophils.  Her sodium is   133, potassium is 4.9, chloride is 104, CO2 is 18, BUN is 16, creatinine 0.99,   which is improved from her initial admission creatinine of 1.27.  Her lactic   acid is normal at 1.2.     DIAGNOSTIC DATA:  She did have imaging at the outside facility, which did   reveal pancolitis and again, biomarkers are consistent with C. diff colitis.     IMPRESSION AND PLAN:  An 89-year-old woman with C. diff colitis and associated   septic shock.  She has responded well to ongoing medical management.  Her   numbers in terms of her white count, pressor requirements and renal function   are all improved.  I did discuss with her the potential for colectomy in the   setting and she expressed a strong desire to avoid this if possible.  She   would, however, pursue that if it was felt to be the avenue that would give   her the best chance of survival.  At this point, given the fact that her   numbers are improving and her symptoms are also improving, I think it is   reasonable to continue medical management.  I did discuss with her that if she   had a clinical deterioration then consideration should be given to a subtotal   colectomy with creation of an ileostomy.  She is still having stools.  I do   not see real benefit to placing an irrigation catheter in her terminal ileum   at this point either.  Continue medical management and I will follow her   along.        ______________________________  Yuriy GEORGE  MD KEEGAN Gabriel/JUAN    DD:  07/27/2021 21:18  DT:  07/27/2021 22:10    Job#:  [id number]

## 2021-07-28 NOTE — PROGRESS NOTES
Report received from Cecilia NICHOLSON. Patient resting in bed, A/O x4, on vasopressor therapy for BP support, on 2L NC, O2 sat 99%. Denies pain at this time, safety precautions in place, call light and belongings within reach, all needs met at this time.

## 2021-07-28 NOTE — PROGRESS NOTES
Pt arrived to unit via gurney.. Tele monitor applied, vitals taken. Pt assessed. A&O x 4.  Discussed POC with pt, including C. Diff2 Welcome folder provided and discussed. Communication board filled out. Questions and concerns addressed, verbalized understanding. Fall precautions in place. Pt demonstrates ability to use call light appropriately. Pt left resting in chair.

## 2021-07-28 NOTE — DISCHARGE PLANNING
Anticipated Discharge Disposition:   Home when medically cleared    Action:   Chart review complete.     RN LEROY received an email from patient's daughter, Susan. Susan sent LYN HARPER POA paperwork for patient. Susan is the patient's POA. Susan was also requesting that patient facetime or use an Ipad to communicate with family out of the state--notified bedside RN of this request.     Copy made of POA paperwork and paperwork faxed to DPA. DPA notified.   0900: Copy of POA paperwork placed in patient's chart.     Barriers to Discharge:   Medical Clearance    Plan:   Hospital care management will continue to assist with discharge planning needs.

## 2021-07-28 NOTE — PROGRESS NOTES
Critical Care Progress Note    Date of admission  7/26/2021    Chief Complaint  89 y.o. female admitted 7/26/2021 with septic shock due to C. difficile pancolitis    Hospital Course  Very pleasant 89-year-old female with past medical history of pacemaker placement, hypertension, hyperlipidemia, hydrochlorothiazide, lisinopril, metoprolol, omeprazole, simvastatin transferred from [facility] in [place], [place] on 7/26/2021 for septic shock due to C. difficile pancolitis.  She presented there on 7/23 with complaints of weakness and diarrhea.     Prior to admission she had several days of soft normal colored stools without blood along with occasional nausea and nonbloody nonbilious emesis.  She was admitted and started on IV fluids and empiric antibiotics with vancomycin/Zosyn.  KARIS noted on admission.  She became hypotensive and febrile on hospital day #2, prompting labs showing a substantial leukocytosis and C. difficile positive stool.  CT abdomen/pelvis 7/25 revealed pancolitis without SBO or megacolon.  Diverticulosis without diverticulitis in the descending and sigmoid colon. IV antibiotics were discontinued and she was transitioned to oral vancomycin and IV Flagyl.  Blood cultures reportedly negative.  Hypotension was refractory to resuscitation she was started on IV vasopressors for hemodynamic support was subsequently transferred for higher level of care.     Labs this morning from outside hospital notable for WBC 34.9, hemoglobin 9.1, platelets 259.  20% bands.     Sodium 133, potassium 3.9, bicarb 21, creatinine 1.48     Stool PCR collected 7/25+ for C. difficile toxin A/P.  SARS-CoV-2 rapid JOSE negative.     Interval Problem Update  Chart review from the past 24 hours includes imaging, laboratory studies, vital signs and notes available.  Pertinent data for today's visit includes    ID and gen surg consulted 7/27 due to uptrending pressor requirements and WBC, pt decided to hold  off on surgery, doing much better today    Cardiac: levophed 10mcg/min -> off!!  Pulm: 3LPM NC -> 2LPM  Neuro: intact, speech slightly delayed  Heme: Leukocytosis WBC 39 -> 21, bandemia  IO: + 2.8L, UOP 1L/24'  ID:  Cont PO vanco 500 q6H, IV Flagyl 500 Q8H, BCx NGTD, lactic acid stable  GI/endo: start CLD, cont LR @100cc/hr  Labs/Imaging: AXR stable, last BM this AM, distension stable  Lines: RIJ central line 7/26, hernandez  Mobility: OOB to chair  Symptoms: distension    Review of Systems  Review of Systems   Constitutional: Negative for chills, fever and weight loss.   HENT: Negative for congestion, sinus pain and sore throat.    Eyes: Negative for pain and discharge.   Respiratory: Negative for cough, sputum production, shortness of breath, wheezing and stridor.    Cardiovascular: Negative for chest pain, orthopnea and leg swelling.   Gastrointestinal: Negative for abdominal pain, diarrhea, nausea and vomiting.        Distension   Genitourinary: Negative for dysuria, frequency and urgency.   Musculoskeletal: Negative for myalgias.   Skin: Negative for rash.   Neurological: Negative for dizziness, sensory change, focal weakness, loss of consciousness and headaches.   Psychiatric/Behavioral: Negative.    All other systems reviewed and are negative.    Vital Signs for last 24 hours   Temp:  [36.6 °C (97.8 °F)-37.3 °C (99.1 °F)] 36.6 °C (97.8 °F)  Pulse:  [60-72] 70  Resp:  [16-47] 21  BP: ()/(44-85) 112/64  SpO2:  [88 %-100 %] 96 %    Physical Exam   Physical Exam  Vitals and nursing note reviewed.   Constitutional:       General: She is not in acute distress.     Appearance: Normal appearance. She is well-developed. She is not diaphoretic.   Eyes:      General: No scleral icterus.        Right eye: No discharge.         Left eye: No discharge.      Conjunctiva/sclera: Conjunctivae normal.      Pupils: Pupils are equal, round, and reactive to light.   Neck:      Thyroid: No thyromegaly.      Vascular: No JVD.    Cardiovascular:      Rate and Rhythm: Normal rate and regular rhythm.      Heart sounds: Normal heart sounds. No murmur heard.   No gallop.    Pulmonary:      Effort: Pulmonary effort is normal. No respiratory distress.      Breath sounds: Normal breath sounds. No wheezing or rales.   Abdominal:      General: There is distension.      Palpations: Abdomen is soft.      Tenderness: There is abdominal tenderness. There is no guarding.      Comments: Decreased BS   Musculoskeletal:         General: No tenderness.   Lymphadenopathy:      Cervical: No cervical adenopathy.   Skin:     General: Skin is warm.      Capillary Refill: Capillary refill takes less than 2 seconds.      Findings: No erythema or rash.   Neurological:      General: No focal deficit present.      Mental Status: She is alert and oriented to person, place, and time.      Cranial Nerves: No cranial nerve deficit.      Sensory: No sensory deficit.      Motor: No abnormal muscle tone.   Psychiatric:         Behavior: Behavior normal.       Medications  Current Facility-Administered Medications   Medication Dose Route Frequency Provider Last Rate Last Admin   • magnesium sulfate IVPB premix 4 g  4 g Intravenous Once Mp Forde M.D. 25 mL/hr at 07/28/21 0853 4 g at 07/28/21 0853   • heparin injection 5,000 Units  5,000 Units Subcutaneous Q12HRS Mp Forde M.D.   5,000 Units at 07/28/21 0853   • ipratropium-albuterol (DUONEB) nebulizer solution  3 mL Nebulization Q4H PRN (RT) Mp Forde M.D.   3 mL at 07/28/21 1049   • ALPRAZolam (XANAX) tablet 0.125 mg  0.125 mg Oral QDAY PRN Mp Forde M.D.       • lactated ringers infusion   Intravenous Continuous Mp Forde M.D. 100 mL/hr at 07/28/21 0814 New Bag at 07/28/21 0814   • ondansetron (ZOFRAN) syringe/vial injection 4 mg  4 mg Intravenous Q4HRS PRN Mp Forde M.D.   4 mg at 07/27/21 0608   • ondansetron (ZOFRAN ODT) dispertab 4 mg  4 mg Oral Q4HRS PRN Mp LUNA  JOSE Forde.       • simvastatin (ZOCOR) tablet 10 mg  10 mg Oral Q EVENING Mp Forde M.D.   10 mg at 07/26/21 1715   • vancomycin 50 mg/mL oral soln 500 mg  500 mg Oral Q6HRS Mp Forde M.D.   500 mg at 07/28/21 1132    And   • metroNIDAZOLE (FLAGYL) IVPB 500 mg  500 mg Intravenous Q8HRS Mp Forde M.D.   Stopped at 07/28/21 0645   • acetaminophen (Tylenol) tablet 650 mg  650 mg Oral Q6HRS PRN Mp Forde M.D.   650 mg at 07/26/21 1753       Fluids    Intake/Output Summary (Last 24 hours) at 7/28/2021 1133  Last data filed at 7/28/2021 1000  Gross per 24 hour   Intake 2828.33 ml   Output 1020 ml   Net 1808.33 ml       Laboratory          Recent Labs     07/26/21  1520 07/26/21  1520 07/27/21  0350 07/27/21  1452 07/28/21  0418   SODIUM 128*   < > 129* 133* 134*   POTASSIUM 4.7   < > 4.8 4.9 4.8   CHLORIDE 99   < > 101 104 105   CO2 20   < > 17* 18* 18*   BUN 17   < > 17 16 15   CREATININE 1.27   < > 1.08 0.99 0.90   MAGNESIUM 1.7  --  1.5  --  1.4*   PHOSPHORUS 2.5  --  2.7  --  2.7   CALCIUM 8.6   < > 8.7 8.8 8.8    < > = values in this interval not displayed.     Recent Labs     07/26/21  1520 07/26/21  1520 07/27/21  0350 07/27/21 1452 07/28/21  0418   ALTSGPT 10  --  8  --  7   ASTSGOT 9*  --  7*  --  5*   ALKPHOSPHAT 85  --  128*  --  72   TBILIRUBIN 0.2  --  0.2  --  0.2   GLUCOSE 100*   < > 99 105* 81    < > = values in this interval not displayed.     Recent Labs     07/26/21  1520 07/26/21  1520 07/27/21  0350 07/27/21  1452 07/28/21  0418   WBC 34.1*   < > 39.4* 32.9* 21.3*   NEUTSPOLYS 91.00*  --  89.00*  --  86.10*   LYMPHOCYTES 2.00*  --  2.30*  --  4.80*   MONOCYTES 6.00  --  4.70  --  5.30   EOSINOPHILS 1.00  --  0.20  --  0.90   BASOPHILS 0.00  --  0.60  --  0.50   ASTSGOT 9*  --  7*  --  5*   ALTSGPT 10  --  8  --  7   ALKPHOSPHAT 85  --  128*  --  72   TBILIRUBIN 0.2  --  0.2  --  0.2    < > = values in this interval not displayed.     Recent Labs     07/26/21  7083  07/26/21  1520 07/27/21  0350 07/27/21  1452 07/28/21  0418   RBC 3.08*   < > 3.07* 3.15* 2.99*   HEMOGLOBIN 9.7*   < > 9.5* 9.9* 9.2*   HEMATOCRIT 28.8*   < > 28.7* 29.5* 28.4*   PLATELETCT 285   < > 340 332 309   PROTHROMBTM 16.4*  --   --   --   --    INR 1.43*  --   --   --   --     < > = values in this interval not displayed.       Imaging  X-Ray:  I have personally reviewed the images and compared with prior images.  CT:    Reviewed  Abdominal plain film unchanged  Outside images obtained and personally reviewed    Assessment/Plan    * Septic shock due to Clostridioides difficile (HCC)  Assessment & Plan  This is Septic shock Present on admission  SIRS criteria identified on my evaluation include: Tachycardia, with heart rate greater than 90 BPM and Bandemia, greater than 10% bands  Source is c diff pancolitis  Presentation includes: Severe sepsis present and persistent hypotension after 30 ml/kg completed.   Despite appropriate fluid resuscitation with crystalloid given per sepsis guidelines, the patient remains hypotensive with systolic blood pressure less than 90 or MAP less than 65  Hemodynamic support with additional fluids and IV vasopressors as needed to maintain a SBP of 90 or MAP of 65  IV antibiotics as appropriate for source of sepsis  Reassessment: I have reassessed the patient's hemodynamic status    Has made dramatic improvement in the last 24 hours, weaned off of Levophed, WBC from 39 -> 21k  Appreciate ID and Gen surg input  Lactic acid < 2, renal function stable  Cont LR @100 cc/hr, start CLD  Transfer to telemetry, maintain hernandez and central line for now    Hyponatremia  Assessment & Plan  Due to sepsis/SIADH  Cont LR mIVF  Plan of care as outlined above.    KARIS (acute kidney injury) ([facility])  Assessment & Plan  Stable  Strict IOs  Avoid nephrotoxins    History of pacemaker  Assessment & Plan  AV pacer wires without AICD  Currently V paced    Pancolitis ([facility])  Assessment & Plan  Due to cdiff  colitis  Images from outside hospital have been uploaded  Cont daily KUB  Plan of care as outlined above.    Full code status  Assessment & Plan  Confirmed with patient 7/26/2021    VTE:  Lovenox  Ulcer: Not Indicated  Lines: Central Line  Ongoing indication addressed and Andrade Catheter  Ongoing indication addressed    I have performed a physical exam and reviewed and updated ROS and Plan today (7/28/2021). In review of yesterday's note (7/27/2021), there are no changes except as documented above.     Discussed patient condition and risk of morbidity and/or mortality with Family, RN, RT, Pharmacy, Patient and general surgery and infectious disease     The patient remains critically ill.  Critical care time = 50 minutes in directly providing and coordinating critical care and extensive data review.  No time overlap and excludes procedures.    This note was generated using voice recognition software which has a chance of producing errors of grammar and content.  I have made every reasonable attempt to find and correct any errors, but it should be expected that some may not be found prior to finalization of this note.  __________  Mp Forde MD  Pulmonary and Critical Care Medicine  [facility]

## 2021-07-28 NOTE — PROGRESS NOTES
Infectious Disease Progress Note    Author: Amee Hyde M.D. Date & Time of service: 2021  1:10 PM    Chief Complaint:  Follow-up for septic shock, C. difficile colitis    Interval History:   afebrile, WBC 21.3.  White count improved overall.  Weaned off of pressors.  Patient also on room air.  Patient is sitting up in the chair and feels well.  Denies any abdominal pain      Labs Reviewed and Medications Reviewed.    Review of Systems:  Review of Systems   Constitutional: Negative for chills and fever.   Respiratory: Negative for cough and shortness of breath.    Gastrointestinal: Positive for diarrhea. Negative for abdominal pain, nausea and vomiting.   Musculoskeletal: Positive for joint pain.        Right knee   Neurological: Negative for dizziness and headaches.   All other systems reviewed and are negative.      Hemodynamics:  Temp (24hrs), Av °C (98.6 °F), Min:36.6 °C (97.8 °F), Max:37.3 °C (99.1 °F)  Temperature: 36.6 °C (97.8 °F), Monitored Temp: 36.4 °C (97.5 °F)  Pulse  Av.2  Min: 56  Max: 72   Blood Pressure : 120/61       Physical Exam:  Physical Exam  Vitals and nursing note reviewed.   HENT:      Mouth/Throat:      Mouth: Mucous membranes are moist.      Pharynx: No oropharyngeal exudate.   Eyes:      Extraocular Movements: Extraocular movements intact.      Pupils: Pupils are equal, round, and reactive to light.   Neck:      Comments: Right IJ catheter  Cardiovascular:      Rate and Rhythm: Normal rate and regular rhythm.   Pulmonary:      Effort: Pulmonary effort is normal. No respiratory distress.      Breath sounds: No wheezing.   Abdominal:      General: There is no distension.      Palpations: Abdomen is soft.      Tenderness: There is no abdominal tenderness. There is no guarding.   Musculoskeletal:         General: No swelling. Normal range of motion.      Cervical back: Normal range of motion and neck supple.   Skin:     General: Skin is warm and dry.   Neurological:       General: No focal deficit present.      Mental Status: She is alert and oriented to person, place, and time.   Psychiatric:         Mood and Affect: Mood normal.         Behavior: Behavior normal.      Comments: Pleasant         Meds:    Current Facility-Administered Medications:   •  heparin  •  ipratropium-albuterol  •  ALPRAZolam  •  LR  •  ondansetron  •  ondansetron  •  simvastatin  •  vancomycin 50 mg/mL **AND** metroNIDAZOLE (FLAGYL) IV  •  acetaminophen    Labs:  Recent Labs     07/26/21  1520 07/26/21  1520 07/27/21  0350 07/27/21  1452 07/28/21 0418   WBC 34.1*   < > 39.4* 32.9* 21.3*   RBC 3.08*   < > 3.07* 3.15* 2.99*   HEMOGLOBIN 9.7*   < > 9.5* 9.9* 9.2*   HEMATOCRIT 28.8*   < > 28.7* 29.5* 28.4*   MCV 93.5   < > 93.5 93.7 95.0   MCH 31.5   < > 30.9 31.4 30.8   RDW 45.3   < > 45.1 45.5 46.8   PLATELETCT 285   < > 340 332 309   MPV 10.1   < > 10.2 9.6 9.5   NEUTSPOLYS 91.00*  --  89.00*  --  86.10*   LYMPHOCYTES 2.00*  --  2.30*  --  4.80*   MONOCYTES 6.00  --  4.70  --  5.30   EOSINOPHILS 1.00  --  0.20  --  0.90   BASOPHILS 0.00  --  0.60  --  0.50   RBCMORPHOLO Present  --   --   --   --     < > = values in this interval not displayed.     Recent Labs     07/27/21  0350 07/27/21 1452 07/28/21 0418   SODIUM 129* 133* 134*   POTASSIUM 4.8 4.9 4.8   CHLORIDE 101 104 105   CO2 17* 18* 18*   GLUCOSE 99 105* 81   BUN 17 16 15     Recent Labs     07/26/21  1520 07/26/21  1520 07/27/21  0350 07/27/21  1452 07/28/21  0418   ALBUMIN 2.5*  --  2.4*  --  2.1*   TBILIRUBIN 0.2  --  0.2  --  0.2   ALKPHOSPHAT 85  --  128*  --  72   TOTPROTEIN 4.9*  --  4.7*  --  4.2*   ALTSGPT 10  --  8  --  7   ASTSGOT 9*  --  7*  --  5*   CREATININE 1.27   < > 1.08 0.99 0.90    < > = values in this interval not displayed.       Imaging:  [id number] VIEW    Result Date: 7/28/2021 7/28/2021 5:25 AM HISTORY/REASON FOR EXAM:  Distention; cdiff pancolitis.. TECHNIQUE/EXAM DESCRIPTION AND NUMBER OF VIEWS:  2 view(s) of the  abdomen. COMPARISON: 7/27/2021 FINDINGS: No free air under the diaphragm.  Slightly less prominent gaseous distention of bowel loops. There is probably some persistent colonic wall thickening although not adequately evaluated on this study. No acute osseous abnormality.     Nonobstructive bowel gas pattern.    [id number] VIEW    Result Date: 7/27/2021 7/27/2021 10:03 AM HISTORY/REASON FOR EXAM: Abdominal distention. TECHNIQUE/EXAM DESCRIPTION AND NUMBER OF VIEWS: 1 supine views of the abdomen. COMPARISON: None FINDINGS: There is no evidence of bowel obstruction. There are nondilated gas-filled loops of bowel. There is likely some pleural thickening of the colon. No abnormal calcifications are seen.     1.  No evidence of bowel obstruction. 2.  Likely areas of wall thickening involving the colon consistent with patient's history C. Difficile colitis.    [id number] VIEW    Result Date: 7/26/2021 7/26/2021 3:28 PM HISTORY/REASON FOR EXAM:  Lower abdominal pain. TECHNIQUE/EXAM DESCRIPTION AND NUMBER OF VIEWS:  1 view(s) of the abdomen. COMPARISON: None FINDINGS: The bowel gas pattern is nonobstructive. There are air-filled loops of colon with questionable areas of wall thickening in the right lower quadrant and left lower quadrants. There is no free air. Ill-defined calcification in the left lower quadrant may represent a calcified uterine fibroid. There is a right lower quadrant phlebolith. Visualized chest demonstrates cardiac leads projecting over the cardiac silhouette. Lung bases are clear. There are degenerative changes in the spine and hips.     1.  Bowel gas pattern is nonobstructive. 2.  There is a question of colonic wall thickening in the right lower quadrant and left lower quadrant. This is nonspecific. This could be due to inflammation or infection. 3.  There is a left hemipelvic calcification, possibly a calcified uterine fibroid.    DX-CHEST-FOR LINE PLACEMENT Perform procedure in: Patient's  "Room    Result Date: 7/26/2021 7/26/2021 3:51 PM HISTORY/REASON FOR EXAM: Central Line Placement. TECHNIQUE/EXAM DESCRIPTION AND NUMBER OF VIEWS: Single portable view of the chest. COMPARISON: None. FINDINGS: Right internal jugular line tip overlies the cavoatrial junction Left transsubclavian pacer is present. The generator obscures underlying structures. Cardiomediastinal contours are notable for mild aortic ectasia and atherosclerosis Lungs demonstrate mild hazy opacity especially at the costophrenic sulci No pneumothorax is seen.     No pneumothorax identified following right IJ line placement Mild hazy opacity is concerning for interstitial edema Mild aortic ectasia      Micro:  Results     Procedure Component Value Units Date/Time    Blood Culture [[id number]] Collected: 07/26/21 1520    Order Status: Completed Specimen: Blood from Peripheral Updated: 07/27/21 0708     Significant Indicator NEG     Source BLD     Site PERIPHERAL     Culture Result No Growth  Note: Blood cultures are incubated for 5 days and  are monitored continuously.Positive blood cultures  are called to the RN and reported as soon as  they are identified.  Blood culture testing and Gram stain, if indicated, are  performed at [facility], [address] [place] [address].  Positive blood cultures are  sent to [facility], [address] [place] [address], for organism identification and  susceptibility testing.      Narrative:      From different peripheral sites, if not done within the last  24 hours (Per Hospital Policy: Only change specimen source to  \"Line\" if specified by physician order)  Right AC    Blood Culture [[id number]] Collected: 07/26/21 1520    Order Status: Completed Specimen: Blood from Peripheral Updated: 07/27/21 0708     Significant Indicator NEG     Source BLD     Site PERIPHERAL     Culture Result No Growth  Note: Blood cultures are incubated for 5 days and  are monitored " "continuously.Positive blood cultures  are called to the RN and reported as soon as  they are identified.  Blood culture testing and Gram stain, if indicated, are  performed at [facility], [address] [place] [address].  Positive blood cultures are  sent to [facility], [address] [place] [address], for organism identification and  susceptibility testing.      Narrative:      From different peripheral sites, if not done within the last  24 hours (Per Hospital Policy: Only change specimen source to  \"Line\" if specified by physician order)  Left AC          Assessment:  Active Hospital Problems    Diagnosis    • *Septic shock due to Clostridioides difficile (HCC) [A41.4, R65.21]    • Pancolitis (HCC) [K51.00]    • History of pacemaker [Z95.0]    • KARIS (acute kidney injury) (HCC) [N17.9]    • Full code status [Z78.9]    • Hyponatremia [E87.1]      89 y.o. elderly woman with a history of hyperlipidemia, hypertension and pacemaker in place admitted from an outside hospital secondary to weakness and diarrhea.  Outside hospital CT scan revealed pancolitis in the setting of positive C. difficile PCR.  Her outside hospital course was complicated by septic shock for which she was transferred to [facility] for higher level care and started on pressors on 7/26.  She is currently on oral vancomycin 500 mg every 6 hours and IV Flagyl.  Clinically improving.  Now off pressors.  Abdominal exam benign.    Pertinent diagnoses:  Septic shock.  Shock resolved  Clostridiodes difficile colitis  Acute kidney injury, resolving  Leukocytosis, improving    Plan:  -Continue oral vancomycin 500 mg every 6 hours and IV Flagyl 500 mg 3 times daily  -Plan a 10-day course of antibiotics.  Stop date 08/05/2021    Plan of care discussed with intensivist, Dr. Forde.  ID signing off.  Please reconsult if needed  "

## 2021-07-28 NOTE — PROGRESS NOTES
Patient rested throughout day. Levophed titrated down as BP and MAP improving. Patient and family (Leo, Makeda) updated on plan of care. Granddaughter Dodie at bedside for part of afternoon.

## 2021-07-28 NOTE — PROGRESS NOTES
Surgical Progress Note    Author: Yuriy Gabriel M.D. Date & Time created: 2021   3:58 PM     Interval Events:  Feels well  Off pressors  More formed stools  Denies any pain    Review of Systems   Gastrointestinal: Negative for abdominal pain.     Hemodynamics:  Temp (24hrs), Av.9 °C (98.4 °F), Min:36.6 °C (97.8 °F), Max:37.3 °C (99.1 °F)  Temperature: 36.6 °C (97.8 °F), Monitored Temp: 36.4 °C (97.5 °F)  Pulse  Av.2  Min: 56  Max: 72   Blood Pressure : 120/61     Respiratory:    Respiration: 20, Pulse Oximetry: 95 %     Given By:: Mouthpiece, Work Of Breathing / Effort: Mild  RUL Breath Sounds: Clear, RML Breath Sounds: Expiratory Wheezes, RLL Breath Sounds: Expiratory Wheezes, JUAN JOSE Breath Sounds: Clear, LLL Breath Sounds: Expiratory Wheezes  Neuro:  GCS       Fluids:    Intake/Output Summary (Last 24 hours) at 2021 1558  Last data filed at 2021 1400  Gross per 24 hour   Intake 2668.49 ml   Output 905 ml   Net 1763.49 ml     Weight: 78.8 kg (173 lb 11.6 oz)  Current Diet Order   Procedures   • Diet Order Diet: Clear Liquid     Physical Exam  HENT:      Nose: Nose normal.   Eyes:      Extraocular Movements: Extraocular movements intact.      Pupils: Pupils are equal, round, and reactive to light.   Cardiovascular:      Rate and Rhythm: Regular rhythm.   Pulmonary:      Effort: Pulmonary effort is normal.      Breath sounds: Normal breath sounds.   Abdominal:      General: There is distension.      Tenderness: There is no abdominal tenderness.   Musculoskeletal:         General: Normal range of motion.      Cervical back: Normal range of motion.   Skin:     General: Skin is warm and dry.      Capillary Refill: Capillary refill takes less than 2 seconds.   Neurological:      General: No focal deficit present.      Mental Status: She is alert.   Psychiatric:         Mood and Affect: Mood normal.       Labs:  Recent Results (from the past 24 hour(s))   CBC WITH DIFFERENTIAL    Collection Time:  07/28/21  4:18 AM   Result Value Ref Range    WBC 21.3 (H) 4.8 - 10.8 K/uL    RBC 2.99 (L) 4.20 - 5.40 M/uL    Hemoglobin 9.2 (L) 12.0 - 16.0 g/dL    Hematocrit 28.4 (L) 37.0 - 47.0 %    MCV 95.0 81.4 - 97.8 fL    MCH 30.8 27.0 - 33.0 pg    MCHC 32.4 (L) 33.6 - 35.0 g/dL    RDW 46.8 35.9 - 50.0 fL    Platelet Count 309 164 - 446 K/uL    MPV 9.5 9.0 - 12.9 fL    Neutrophils-Polys 86.10 (H) 44.00 - 72.00 %    Lymphocytes 4.80 (L) 22.00 - 41.00 %    Monocytes 5.30 0.00 - 13.40 %    Eosinophils 0.90 0.00 - 6.90 %    Basophils 0.50 0.00 - 1.80 %    Immature Granulocytes 2.40 (H) 0.00 - 0.90 %    Nucleated RBC 0.00 /100 WBC    Neutrophils (Absolute) 18.30 (H) 2.00 - 7.15 K/uL    Lymphs (Absolute) 1.03 1.00 - 4.80 K/uL    Monos (Absolute) 1.12 (H) 0.00 - 0.85 K/uL    Eos (Absolute) 0.19 0.00 - 0.51 K/uL    Baso (Absolute) 0.10 0.00 - 0.12 K/uL    Immature Granulocytes (abs) 0.51 (H) 0.00 - 0.11 K/uL    NRBC (Absolute) 0.00 K/uL   Comp Metabolic Panel    Collection Time: 07/28/21  4:18 AM   Result Value Ref Range    Sodium 134 (L) 135 - 145 mmol/L    Potassium 4.8 3.6 - 5.5 mmol/L    Chloride 105 96 - 112 mmol/L    Co2 18 (L) 20 - 33 mmol/L    Anion Gap 11.0 7.0 - 16.0    Glucose 81 65 - 99 mg/dL    Bun 15 8 - 22 mg/dL    Creatinine 0.90 0.50 - 1.40 mg/dL    Calcium 8.8 8.4 - 10.2 mg/dL    AST(SGOT) 5 (L) 12 - 45 U/L    ALT(SGPT) 7 2 - 50 U/L    Alkaline Phosphatase 72 30 - 99 U/L    Total Bilirubin 0.2 0.1 - 1.5 mg/dL    Albumin 2.1 (L) 3.2 - 4.9 g/dL    Total Protein 4.2 (L) 6.0 - 8.2 g/dL    Globulin 2.1 1.9 - 3.5 g/dL    A-G Ratio 1.0 g/dL   MAGNESIUM    Collection Time: 07/28/21  4:18 AM   Result Value Ref Range    Magnesium 1.4 (L) 1.5 - 2.5 mg/dL   PHOSPHORUS    Collection Time: 07/28/21  4:18 AM   Result Value Ref Range    Phosphorus 2.7 2.5 - 4.5 mg/dL   LACTIC ACID    Collection Time: 07/28/21  4:18 AM   Result Value Ref Range    Lactic Acid 1.2 0.5 - 2.0 mmol/L   ESTIMATED GFR    Collection Time: 07/28/21  4:18  AM   Result Value Ref Range    GFR If African American >60 >60 mL/min/1.73 m 2    GFR If Non African American 59 (A) >60 mL/min/1.73 m 2     Medical Decision Making, by Problem:  Active Hospital Problems    Diagnosis    • Septic shock due to Clostridioides difficile (HCC) [A41.4, R65.21]    • Pancolitis (HCC) [K51.00]    • 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

## 2021-07-28 NOTE — PROGRESS NOTES
4 Eyes Skin Assessment Completed by LYN Dill and LYN Hoffmann.    Head WDL  Ears WDL  Nose WDL  Mouth WDL  Neck WDL  Breast/Chest WDL  Shoulder Blades WDL  Spine WDL  (R) Arm/Elbow/Hand Blanching, edema  (L) Arm/Elbow/Hand Blanching, edema  Abdomen WDL  Groin WDL  Scrotum/Coccyx/Buttocks Blanching, Redness  (R) Leg Edema  (L) Leg Edema  (R) Heel/Foot/Toe Edema  (L) Heel/Foot/Toe Edema          Devices In Places Tele Box, Andrade, Central Line and Pacer, SCDs      Interventions In Place N/A    Possible Skin Injury No    Pictures Uploaded Into Epic N/A  Wound Consult Placed N/A  RN Wound Prevention Protocol Ordered No

## 2021-07-28 NOTE — PROGRESS NOTES
Received report and assumed care of pt. Pt resting in bed at this time. Lines and gtts verified. Pt has no complaints of pain at this time.

## 2021-07-28 NOTE — THERAPY
Physical Therapy   Initial Evaluation     Patient Name: Cesia Franklin  Age:  89 y.o., Sex:  female  Medical Record #: [id number]  Today's Date: 7/28/2021     Precautions: Fall Risk (contact pxs for CDiff)    Assessment  Patient is 89 y.o. female with a diagnosis of sepsis, + CDiff, pancolitis. Pt is presenting with impaired strength and endurance. Pt lives alone in [place], [place]. Most likely pt will benefit from further therapy at SNF prior to DC home.      Plan    Recommend Physical Therapy 5 times per week until therapy goals are met for the following treatments:  Bed Mobility, Gait Training, Neuro Re-Education / Balance, Stair Training, Therapeutic Activities and Therapeutic Exercises    DC Equipment Recommendations: Unable to determine at this time  Discharge Recommendations: Recommend post-acute placement for additional physical therapy services prior to discharge home        07/28/21 1230   Prior Living Situation   Housing / Facility 1 Story House   Steps Into Home 1   Steps In Home 0   Equipment Owned Front-Wheel Walker;Single Point Cane   Lives with - Patient's Self Care Capacity Alone and Able to Care For Self   Comments Pt reports son lives nearby and a friend can check on her. Pt lives in [place], [place]   Prior Level of Functional Mobility   Bed Mobility Independent   Transfer Status Independent   Ambulation Independent   Assistive Devices Used Single Point Cane   Stairs Independent   Cognition    Level of Consciousness Alert   Comments Oriented x4   Passive ROM Lower Body   Passive ROM Lower Body X   Comments R knee stiffness/ painful- had a R TKA this past spring   Active ROM Lower Body    Active ROM Lower Body  X   Comments R knee limited by pain   Strength Lower Body   Lower Body Strength  X   Comments B LE grossly 4-/5   Balance Assessment   Sitting Balance (Static) Fair +   Sitting Balance (Dynamic) Fair   Standing Balance (Static) Fair -   Standing Balance (Dynamic) Poor +   Weight Shift Sitting Fair    Weight Shift Standing Fair   Comments stdg with FWW   Gait Analysis   Gait Level Of Assist Minimal Assist   Assistive Device Front Wheel Walker   Distance (Feet) 10   # of Times Distance was Traveled 1   Deviation Bradykinetic;Shuffled Gait;Step To   Bed Mobility    Supine to Sit   (NT, pt sitting up in chair )   Functional Mobility   Sit to Stand Moderate Assist   Bed, Chair, Wheelchair Transfer Minimal Assist   Transfer Method Stand Step  (with FWW)   Activity Tolerance   Standing mod to max SOB after amb on RA, sats 95%   Short Term Goals    Short Term Goal # 1 Pt will be able to perform bed mobility and sup <> sit Antonio in 6 visits.,   Short Term Goal # 2 Pt will be able to perform sit <> stand and transfer Antonio in 6 visits so can DC home safely.   Short Term Goal # 3 Pt will be able to ambulate 200 ft with FWW Antonio in 6 visits so canDC home safely.

## 2021-07-28 NOTE — CARE PLAN
The patient is Watcher - Medium risk of patient condition declining or worsening    Shift Goals  Clinical Goals: Hemodynamic stability, wean off of vasopressor therapy  Patient Goals: Rest, decrease anxiety  Family Goals: Support    Progress made toward(s) clinical / shift goals:    Problem: Hemodynamics  Goal: Patient's hemodynamics, fluid balance and neurologic status will be stable or improve  Outcome: Progressing     Problem: Urinary - Renal Perfusion  Goal: Ability to achieve and maintain adequate renal perfusion and functioning will improve  Outcome: Progressing     Problem: Respiratory  Goal: Patient will achieve/maintain optimum respiratory ventilation and gas exchange  Outcome: Progressing      Patient is not progressing towards the following goals:    Problem: Gastrointestinal Irritability  Goal: Diarrhea will be absent or improved  Outcome: Not Progressing   Patient continues to have loose stools, although improved consistency from yesterday.

## 2021-07-29 ENCOUNTER — APPOINTMENT (OUTPATIENT)
Dept: RADIOLOGY | Facility: MEDICAL CENTER | Age: 86
DRG: 871 | End: 2021-07-29
Attending: HOSPITALIST
Payer: MEDICARE

## 2021-07-29 PROBLEM — D72.829 LEUKOCYTOSIS: Status: ACTIVE | Noted: 2021-07-29

## 2021-07-29 PROBLEM — I95.9 HYPOTENSION: Status: ACTIVE | Noted: 2021-07-29

## 2021-07-29 LAB
ANION GAP SERPL CALC-SCNC: 8 MMOL/L (ref 7–16)
BUN SERPL-MCNC: 14 MG/DL (ref 8–22)
CALCIUM SERPL-MCNC: 8.9 MG/DL (ref 8.4–10.2)
CHLORIDE SERPL-SCNC: 106 MMOL/L (ref 96–112)
CO2 SERPL-SCNC: 21 MMOL/L (ref 20–33)
CREAT SERPL-MCNC: 0.84 MG/DL (ref 0.5–1.4)
ERYTHROCYTE [DISTWIDTH] IN BLOOD BY AUTOMATED COUNT: 46.5 FL (ref 35.9–50)
GLUCOSE SERPL-MCNC: 114 MG/DL (ref 65–99)
HCT VFR BLD AUTO: 30.7 % (ref 37–47)
HGB BLD-MCNC: 9.9 G/DL (ref 12–16)
MAGNESIUM SERPL-MCNC: 2.1 MG/DL (ref 1.5–2.5)
MCH RBC QN AUTO: 30.6 PG (ref 27–33)
MCHC RBC AUTO-ENTMCNC: 32.2 G/DL (ref 33.6–35)
MCV RBC AUTO: 94.8 FL (ref 81.4–97.8)
NT-PROBNP SERPL IA-MCNC: 8247 PG/ML (ref 0–125)
PHOSPHATE SERPL-MCNC: 2.2 MG/DL (ref 2.5–4.5)
PLATELET # BLD AUTO: 327 K/UL (ref 164–446)
PMV BLD AUTO: 9.5 FL (ref 9–12.9)
POTASSIUM SERPL-SCNC: 4.3 MMOL/L (ref 3.6–5.5)
RBC # BLD AUTO: 3.24 M/UL (ref 4.2–5.4)
SODIUM SERPL-SCNC: 135 MMOL/L (ref 135–145)
WBC # BLD AUTO: 13.9 K/UL (ref 4.8–10.8)

## 2021-07-29 PROCEDURE — 770020 HCHG ROOM/CARE - TELE (206)

## 2021-07-29 PROCEDURE — 97535 SELF CARE MNGMENT TRAINING: CPT

## 2021-07-29 PROCEDURE — 97166 OT EVAL MOD COMPLEX 45 MIN: CPT

## 2021-07-29 PROCEDURE — 83735 ASSAY OF MAGNESIUM: CPT

## 2021-07-29 PROCEDURE — 700101 HCHG RX REV CODE 250: Performed by: INTERNAL MEDICINE

## 2021-07-29 PROCEDURE — 80048 BASIC METABOLIC PNL TOTAL CA: CPT

## 2021-07-29 PROCEDURE — 700102 HCHG RX REV CODE 250 W/ 637 OVERRIDE(OP): Performed by: HOSPITALIST

## 2021-07-29 PROCEDURE — 85027 COMPLETE CBC AUTOMATED: CPT

## 2021-07-29 PROCEDURE — 700111 HCHG RX REV CODE 636 W/ 250 OVERRIDE (IP): Performed by: INTERNAL MEDICINE

## 2021-07-29 PROCEDURE — 71045 X-RAY EXAM CHEST 1 VIEW: CPT

## 2021-07-29 PROCEDURE — 84100 ASSAY OF PHOSPHORUS: CPT

## 2021-07-29 PROCEDURE — A9270 NON-COVERED ITEM OR SERVICE: HCPCS | Performed by: INTERNAL MEDICINE

## 2021-07-29 PROCEDURE — 94760 N-INVAS EAR/PLS OXIMETRY 1: CPT

## 2021-07-29 PROCEDURE — 700111 HCHG RX REV CODE 636 W/ 250 OVERRIDE (IP): Performed by: HOSPITALIST

## 2021-07-29 PROCEDURE — 700102 HCHG RX REV CODE 250 W/ 637 OVERRIDE(OP): Performed by: INTERNAL MEDICINE

## 2021-07-29 PROCEDURE — A9270 NON-COVERED ITEM OR SERVICE: HCPCS | Performed by: HOSPITALIST

## 2021-07-29 PROCEDURE — 99233 SBSQ HOSP IP/OBS HIGH 50: CPT | Performed by: INTERNAL MEDICINE

## 2021-07-29 PROCEDURE — 83880 ASSAY OF NATRIURETIC PEPTIDE: CPT

## 2021-07-29 RX ORDER — FUROSEMIDE 10 MG/ML
20 INJECTION INTRAMUSCULAR; INTRAVENOUS
Status: DISCONTINUED | OUTPATIENT
Start: 2021-07-29 | End: 2021-07-30

## 2021-07-29 RX ORDER — BENZONATATE 100 MG/1
100 CAPSULE ORAL 3 TIMES DAILY PRN
Status: DISCONTINUED | OUTPATIENT
Start: 2021-07-29 | End: 2021-08-02 | Stop reason: HOSPADM

## 2021-07-29 RX ADMIN — FUROSEMIDE 20 MG: 10 INJECTION, SOLUTION INTRAMUSCULAR; INTRAVENOUS at 05:20

## 2021-07-29 RX ADMIN — VANCOMYCIN HYDROCHLORIDE 500 MG: KIT ORAL at 17:39

## 2021-07-29 RX ADMIN — VANCOMYCIN HYDROCHLORIDE 500 MG: KIT ORAL at 05:20

## 2021-07-29 RX ADMIN — FUROSEMIDE 20 MG: 10 INJECTION, SOLUTION INTRAMUSCULAR; INTRAVENOUS at 15:37

## 2021-07-29 RX ADMIN — METRONIDAZOLE 500 MG: 500 INJECTION, SOLUTION INTRAVENOUS at 05:21

## 2021-07-29 RX ADMIN — METRONIDAZOLE 500 MG: 500 INJECTION, SOLUTION INTRAVENOUS at 15:37

## 2021-07-29 RX ADMIN — ONDANSETRON 4 MG: 2 INJECTION INTRAMUSCULAR; INTRAVENOUS at 22:56

## 2021-07-29 RX ADMIN — VANCOMYCIN HYDROCHLORIDE 500 MG: KIT ORAL at 23:42

## 2021-07-29 RX ADMIN — HEPARIN SODIUM 5000 UNITS: 5000 INJECTION, SOLUTION INTRAVENOUS; SUBCUTANEOUS at 05:20

## 2021-07-29 RX ADMIN — BENZONATATE 100 MG: 100 CAPSULE ORAL at 04:19

## 2021-07-29 RX ADMIN — METRONIDAZOLE 500 MG: 500 INJECTION, SOLUTION INTRAVENOUS at 22:55

## 2021-07-29 RX ADMIN — VANCOMYCIN HYDROCHLORIDE 500 MG: KIT ORAL at 11:02

## 2021-07-29 RX ADMIN — SIMVASTATIN 10 MG: 10 TABLET, FILM COATED ORAL at 17:39

## 2021-07-29 RX ADMIN — HEPARIN SODIUM 5000 UNITS: 5000 INJECTION, SOLUTION INTRAVENOUS; SUBCUTANEOUS at 17:39

## 2021-07-29 RX ADMIN — FUROSEMIDE 20 MG: 10 INJECTION, SOLUTION INTRAMUSCULAR; INTRAVENOUS at 01:22

## 2021-07-29 RX ADMIN — BENZONATATE 100 MG: 100 CAPSULE ORAL at 17:39

## 2021-07-29 ASSESSMENT — ENCOUNTER SYMPTOMS
HEADACHES: 0
CHILLS: 0
INSOMNIA: 0
MYALGIAS: 0
SHORTNESS OF BREATH: 0
WEAKNESS: 0
BLURRED VISION: 0
CLAUDICATION: 0
FEVER: 0
CONSTIPATION: 0
NERVOUS/ANXIOUS: 0
PHOTOPHOBIA: 0
VOMITING: 0
DIARRHEA: 1
DIZZINESS: 0
SPEECH CHANGE: 0
SENSORY CHANGE: 0
ABDOMINAL PAIN: 0
DEPRESSION: 0
COUGH: 0
HEARTBURN: 0

## 2021-07-29 ASSESSMENT — COGNITIVE AND FUNCTIONAL STATUS - GENERAL
HELP NEEDED FOR BATHING: A LOT
PERSONAL GROOMING: A LITTLE
EATING MEALS: A LITTLE
DRESSING REGULAR LOWER BODY CLOTHING: A LOT
DAILY ACTIVITIY SCORE: 15
SUGGESTED CMS G CODE MODIFIER DAILY ACTIVITY: CK
TOILETING: A LOT
DRESSING REGULAR UPPER BODY CLOTHING: A LITTLE

## 2021-07-29 ASSESSMENT — FIBROSIS 4 INDEX: FIB4 SCORE: 0.51

## 2021-07-29 ASSESSMENT — PAIN DESCRIPTION - PAIN TYPE
TYPE: ACUTE PAIN
TYPE: ACUTE PAIN

## 2021-07-29 ASSESSMENT — ACTIVITIES OF DAILY LIVING (ADL): TOILETING: INDEPENDENT

## 2021-07-29 NOTE — PROGRESS NOTES
"Received patient from NOC RN. Assessment complete. A&Ox4. Denies pain, c/o \"swollen toes\". Elevated lower extremities. POC discussed. Call light within reach. Bed in low, locked position. All needs attended to at this time.   "

## 2021-07-29 NOTE — PROGRESS NOTES
Tele strip at 0230 shows 100% V paced SR.      Measurements from am strip were as follows:  MT=P  QRS=0.14  QT=0.42    Tele Shift Summary:    Rhythm : 100% V Paced SR  Rate : 60-77  Ectopy : Per CCT Alida, pt had R PVCs, R Coup, R Trig.     Telemetry monitoring strips placed in pt's chart.

## 2021-07-29 NOTE — DISCHARGE PLANNING
Anticipated Discharge Disposition:   SNF then home to [place]     Action:   Chart review complete.     PT/OT recommending SNF placement prior to discharge home. SNF referral order placed per protocol. RN CM to collect choice afte rounds.     1130: RN CM met with patient and patient's sons at bedside. RN LEROY explained the purpose of SNF and its generalized duration. Patient would like to go to SNF in [place]. RN LEROY verbalized understanding but stated that if that is the case, medicare may not pay for transportation if patient can go to Local SNF. Family and patient verbalized understanding. Local SNF form given to family and patient to look over.     Barriers to Discharge:   Medical Clearance  SNF choice and acceptance     Plan:   Hospital care management will continue to assist with discharge planning needs.

## 2021-07-29 NOTE — THERAPY
"Occupational Therapy   Initial Evaluation     Patient Name: Cesia Franklin  Age:  89 y.o., Sex:  female  Medical Record #: [id number]  Today's Date: 7/29/2021     Precautions: Fall Risk (contact pxs for Ddiff)    Assessment  Patient is 89 y.o. female diagnosis of C diff and septic shock seen for OT evaluation. Pt reported at baseline she lives at home alone and is able to complete all self cares independently (has assist from friends for shopping, cleaning, laundry, etc.). Pt presented today with impaired balance, decreased activity tolerance, limited insight into deficits, and tearful throughout. Pt required min/mod A to EOB, mod A for STS, max A for LB dressing, and [place] [facility] [place] [facility] [place] Provided extensive education and discussion regarding safety and DC options. Pt is very reluctant to SNF placement at this time (\"they'll have to fix this pooping problem before I go home\"). Based on pt's function at this time, pt is not safe to DC home and is a high fall risk. Recommend post acute placement prior to DC home. Will follow for skilled OT while in house.  Plan    Recommend Occupational Therapy 4 times per week until therapy goals are met for the following treatments:  Adaptive Equipment, Cognitive Skill Development, Neuro Re-Education / Balance, Self Care/Activities of Daily Living, Therapeutic Activities and Therapeutic Exercises.    DC Equipment Recommendations: Unable to determine at this time  Discharge Recommendations: Recommend post-acute placement for additional occupational therapy services prior to discharge home        07/29/21 0850   Initial Contact Note    Initial Contact Note Order Received and Verified, Occupational Therapy Evaluation in Progress with Full Report to Follow.   Prior Living Situation   Prior Services Housekeeping / Homemaker Services   Housing / Facility 1 Story House   Steps Into Home 1   Steps In Home 0   Bathroom Set up Walk In Shower;Shower Chair   Equipment Owned Front-Wheel " Walker;Single Point Cane;Tub / Shower Seat;Raised Toilet Seat Without Arms;Grab Bar(s) By Toilet;Grab Bar(s) In Tub / Shower   Lives with - Patient's Self Care Capacity Alone and Able to Care For Self   Comments Pt lives alone but has daily assist for shopping, laundry, cleaning, etc from friend. Pt also has occasional assist from son. Otherwife pt is independent with ADLs and ambulated household distances. Pt reports she still drives as well   Prior Level of ADL Function   Self Feeding Independent   Grooming / Hygiene Independent   Bathing Independent   Dressing Independent   Toileting Independent   Prior Level of IADL Function   Medication Management Independent   Laundry Requires Assist   Kitchen Mobility Requires Assist   Finances Independent   Home Management Requires Assist   Shopping Requires Assist   Prior Level Of Mobility Independent With Device in Home   Driving / Transportation Driving Independent   History of Falls   History of Falls Yes   Date of Last Fall   (3 prior to knee surgery)   Precautions   Precautions Fall Risk  (contact pxs for Ddiff)   Vitals   O2 (LPM) 2   O2 Delivery Device Silicone Nasal Cannula   Pain   Pain Scales 0 to 10 Scale    Pain 0 - 10 Group   Location Foot   Location Orientation Right;Left   Description Tingling   Cognition    Cognition / Consciousness X   Comments Limited insight into deficits; tearful throughout session regarding current functional level   Active ROM Upper Body   Active ROM Upper Body  WDL   Strength Upper Body   Upper Body Strength  WDL   Balance Assessment   Sitting Balance (Static) Fair +   Sitting Balance (Dynamic) Fair   Standing Balance (Static) Fair -   Standing Balance (Dynamic) Poor +   Weight Shift Sitting Fair   Weight Shift Standing Poor   Comments with FWW and CGA   Bed Mobility    Supine to Sit Moderate Assist   Sit to Supine Minimal Assist   Scooting Supervised   Rolling Supervised   Comments with HOB elevated; used bed features   ADL  Assessment   Upper Body Dressing Minimal Assist   Lower Body Dressing Maximal Assist   Toileting Moderate Assist  (with wiping and clothing management on bedside commode)   Comments extended time and verbal cues for ADL tasks   How much help from another person does the patient currently need...   Putting on and taking off regular lower body clothing? 2   Bathing (including washing, rinsing, and drying)? 2   Toileting, which includes using a toilet, bedpan, or urinal? 2   Putting on and taking off regular upper body clothing? 3   Taking care of personal grooming such as brushing teeth? 3   Eating meals? 3   6 Clicks Daily Activity Score 15   Functional Mobility   Sit to Stand Moderate Assist   Toilet Transfers Moderate Assist   Transfer Method Stand Step   Mobility bed mob>BSC txf>back to bed   Comments with FWW; verbal cues for sequencing safe txf   Activity Tolerance   Sitting in Chair 20 min (on BSC)   Sitting Edge of Bed 10 min   Standing 2 min x2   Patient / Family Goals   Patient / Family Goal #1 return home   Short Term Goals   Short Term Goal # 1 Pt will demo toileting tasks on BSC with min A   Short Term Goal # 2 Pt will complete LB dressing using LRAD with min A   Short Term Goal # 3 Pt will tolerate seated G/H for 10 min with set up   Education Group   Education Provided Transfers   Role of Occupational Therapist Patient Response Patient;Acceptance;Explanation   Transfers Patient Response Patient;Acceptance;Explanation;Verbal Demonstration;Action Demonstration   Problem List   Problem List Decreased Active Daily Living Skills;Decreased Homemaking Skills;Decreased Functional Mobility;Decreased Activity Tolerance;Safety Awareness Deficits / Cognition;Impaired Postural Control / Balance   Anticipated Discharge Equipment and Recommendations   DC Equipment Recommendations Unable to determine at this time   Discharge Recommendations Recommend post-acute placement for additional occupational therapy services  prior to discharge home   Interdisciplinary Plan of Care Collaboration   IDT Collaboration with  Nursing   Patient Position at End of Therapy In Bed;Phone within Reach;Tray Table within Reach;Call Light within Reach;Bed Alarm On   Collaboration Comments OT findings and recs   Session Information   Date / Session Number  7/29 1 (1/4, 8/3), SM   Priority 2

## 2021-07-29 NOTE — PROGRESS NOTES
Surgical Progress Note    Author: Yuriy Gabriel M.D. Date & Time created: 2021   3:58 PM     Interval Events:  Feels well  mobilizing  loose stools  Denies any pain    Review of Systems   Gastrointestinal: Negative for abdominal pain.     Hemodynamics:  Temp (24hrs), Av.4 °C (97.6 °F), Min:36.3 °C (97.4 °F), Max:36.6 °C (97.9 °F)  Temperature: 36.4 °C (97.5 °F), Monitored Temp: 36.4 °C (97.5 °F)  Pulse  Av.2  Min: 56  Max: 72   Blood Pressure : 136/67     Respiratory:    Respiration: (!) 22, Pulse Oximetry: 96 %     Given By:: Mouthpiece, Work Of Breathing / Effort: Mild  RUL Breath Sounds: Crackles;Expiratory Wheezes, RML Breath Sounds: Crackles, RLL Breath Sounds: Crackles, JUAN JOSE Breath Sounds: Crackles, LLL Breath Sounds: Crackles  Neuro:  GCS       Fluids:    Intake/Output Summary (Last 24 hours) at 2021 1558  Last data filed at 2021 1400  Gross per 24 hour   Intake 2668.49 ml   Output 905 ml   Net 1763.49 ml     Weight: 77 kg (169 lb 12.1 oz)  Current Diet Order   Procedures   • Diet Order Diet: Clear Liquid     Physical Exam  HENT:      Nose: Nose normal.   Eyes:      General: No scleral icterus.     Extraocular Movements: Extraocular movements intact.      Pupils: Pupils are equal, round, and reactive to light.   Cardiovascular:      Rate and Rhythm: Regular rhythm.   Pulmonary:      Effort: Pulmonary effort is normal.      Breath sounds: Normal breath sounds.   Abdominal:      General: There is distension.      Palpations: Abdomen is soft.      Tenderness: There is no abdominal tenderness. There is no guarding.   Musculoskeletal:         General: Normal range of motion.      Cervical back: Normal range of motion.   Skin:     General: Skin is warm and dry.      Capillary Refill: Capillary refill takes less than 2 seconds.      Coloration: Skin is not jaundiced.   Neurological:      General: No focal deficit present.      Mental Status: She is alert.   Psychiatric:         Mood and  Affect: Mood normal.       Labs:  Recent Results (from the past 24 hour(s))   proBrain Natriuretic Peptide, NT    Collection Time: 07/29/21 12:17 AM   Result Value Ref Range    NT-proBNP 8247 (H) 0 - 125 pg/mL   CBC WITHOUT DIFFERENTIAL    Collection Time: 07/29/21 12:17 AM   Result Value Ref Range    WBC 13.9 (H) 4.8 - 10.8 K/uL    RBC 3.24 (L) 4.20 - 5.40 M/uL    Hemoglobin 9.9 (L) 12.0 - 16.0 g/dL    Hematocrit 30.7 (L) 37.0 - 47.0 %    MCV 94.8 81.4 - 97.8 fL    MCH 30.6 27.0 - 33.0 pg    MCHC 32.2 (L) 33.6 - 35.0 g/dL    RDW 46.5 35.9 - 50.0 fL    Platelet Count 327 164 - 446 K/uL    MPV 9.5 9.0 - 12.9 fL   MAGNESIUM    Collection Time: 07/29/21 12:17 AM   Result Value Ref Range    Magnesium 2.1 1.5 - 2.5 mg/dL   PHOSPHORUS    Collection Time: 07/29/21 12:17 AM   Result Value Ref Range    Phosphorus 2.2 (L) 2.5 - 4.5 mg/dL   Basic Metabolic Panel    Collection Time: 07/29/21 12:17 AM   Result Value Ref Range    Sodium 135 135 - 145 mmol/L    Potassium 4.3 3.6 - 5.5 mmol/L    Chloride 106 96 - 112 mmol/L    Co2 21 20 - 33 mmol/L    Glucose 114 (H) 65 - 99 mg/dL    Bun 14 8 - 22 mg/dL    Creatinine 0.84 0.50 - 1.40 mg/dL    Calcium 8.9 8.4 - 10.2 mg/dL    Anion Gap 8.0 7.0 - 16.0   ESTIMATED GFR    Collection Time: 07/29/21 12:17 AM   Result Value Ref Range    GFR If African American >60 >60 mL/min/1.73 m 2    GFR If Non African American >60 >60 mL/min/1.73 m 2     Medical Decision Making, by Problem:  Active Hospital Problems    Diagnosis    • Septic shock due to Clostridioides difficile (HCC) [A41.4, R65.21]    • Pancolitis (HCC) [K51.00]    • 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

## 2021-07-29 NOTE — PROGRESS NOTES
"Hospital Medicine Daily Progress Note    Date of Service  7/29/2021    Chief Complaint  Cesia Franklin is a 89 y.o. female admitted 7/26/2021 with sepsis and diarrhea.     Hospital Course  Very pleasant 89-year-old female with past medical history of pacemaker placement, hypertension, hyperlipidemia, hydrochlorothiazide, lisinopril, metoprolol, omeprazole, simvastatin transferred from [facility] in [place], [place] on 7/26/2021 for septic shock due to C. difficile pancolitis.  She presented there on 7/23 with complaints of weakness and diarrhea.     Prior to admission she had several days of soft normal colored stools without blood along with occasional nausea and nonbloody nonbilious emesis.  She was admitted and started on IV fluids and empiric antibiotics with vancomycin/Zosyn.  KARIS noted on admission.  She became hypotensive and febrile on hospital day #2, prompting labs showing a substantial leukocytosis and C. difficile positive stool.  CT abdomen/pelvis 7/25 revealed pancolitis without SBO or megacolon.  Diverticulosis without diverticulitis in the descending and sigmoid colon. IV antibiotics were discontinued and she was transitioned to oral vancomycin and IV Flagyl.  Blood cultures reportedly negative.  Hypotension was refractory to resuscitation she was started on IV vasopressors for hemodynamic support was subsequently transferred for higher level of care.  She has improved and is no longer needing pressors.  Central IV discontinued and she is now in slight volume overload so diuresing.        Interval Problem Update  7/29 Patient feeling okay but still remains very weak and PT is recommending SNF placement.  She is still having loose stools but they are slightly more formed like \"pudding\".  I will advance her diet to GI soft.      I have personally seen and examined the patient at bedside. I discussed the plan of care with patient, bedside RN, charge RN,  and " pharmacy.    Consultants/Specialty  critical care    Code Status  Full Code    Disposition  Patient is not medically cleared.   Anticipate discharge to to skilled nursing facility.  I have placed the appropriate orders for post-discharge needs.    Review of Systems  Review of Systems   Constitutional: Negative for chills and fever.   HENT: Negative for congestion.    Eyes: Negative for blurred vision and photophobia.   Respiratory: Negative for cough and shortness of breath.    Cardiovascular: Negative for chest pain, claudication and leg swelling.   Gastrointestinal: Positive for diarrhea. Negative for abdominal pain, constipation, heartburn and vomiting.   Genitourinary: Negative for dysuria and hematuria.   Musculoskeletal: Negative for joint pain and myalgias.   Skin: Negative for itching and rash.   Neurological: Negative for dizziness, sensory change, speech change, weakness and headaches.   Psychiatric/Behavioral: Negative for depression. The patient is not nervous/anxious and does not have insomnia.         Physical Exam  Temp:  [36.3 °C (97.4 °F)-36.6 °C (97.9 °F)] 36.4 °C (97.5 °F)  Pulse:  [62-70] 64  Resp:  [18-22] 22  BP: (100-149)/(59-71) 136/67  SpO2:  [91 %-97 %] 96 %    Physical Exam  Vitals and nursing note reviewed.   Constitutional:       General: She is not in acute distress.     Appearance: Normal appearance. She is not ill-appearing.   HENT:      Head: Normocephalic and atraumatic.      Nose: Nose normal.   Cardiovascular:      Rate and Rhythm: Normal rate and regular rhythm.      Heart sounds: Normal heart sounds. No murmur heard.     Pulmonary:      Effort: Pulmonary effort is normal.      Breath sounds: Normal breath sounds.   Abdominal:      General: Bowel sounds are normal. There is no distension.      Palpations: Abdomen is soft.   Musculoskeletal:         General: No swelling or tenderness.      Cervical back: Neck supple.   Skin:     General: Skin is warm and dry.   Neurological:       General: No focal deficit present.      Mental Status: She is alert and oriented to person, place, and time.   Psychiatric:         Mood and Affect: Mood normal.         Fluids    Intake/Output Summary (Last 24 hours) at 7/29/2021 1200  Last data filed at 7/29/2021 0621  Gross per 24 hour   Intake 200 ml   Output 2435 ml   Net -2235 ml       Laboratory  Recent Labs     07/27/21  1452 07/28/21  0418 07/29/21  0017   WBC 32.9* 21.3* 13.9*   RBC 3.15* 2.99* 3.24*   HEMOGLOBIN 9.9* 9.2* 9.9*   HEMATOCRIT 29.5* 28.4* 30.7*   MCV 93.7 95.0 94.8   MCH 31.4 30.8 30.6   MCHC 33.6 32.4* 32.2*   RDW 45.5 46.8 46.5   PLATELETCT 332 309 327   MPV 9.6 9.5 9.5     Recent Labs     07/27/21  1452 07/28/21  0418 07/29/21  0017   SODIUM 133* 134* 135   POTASSIUM 4.9 4.8 4.3   CHLORIDE 104 105 106   CO2 18* 18* 21   GLUCOSE 105* 81 114*   BUN 16 15 14   CREATININE 0.99 0.90 0.84   CALCIUM 8.8 8.8 8.9     Recent Labs     07/26/21  1520   INR 1.43*               Imaging  DX-CHEST-PORTABLE (1 VIEW)   Final Result      1.  Cardiomegaly.   2.  Bilateral pulmonary opacities most likely pulmonary edema/CHF.   3.  Possible small left pleural effusion.      [id number] VIEW   Final Result      Nonobstructive bowel gas pattern.      OUTSIDE IMAGES-CT ABDOMEN /PELVIS   Final Result      [id number] VIEW   Final Result      1.  No evidence of bowel obstruction.      2.  Likely areas of wall thickening involving the colon consistent with patient's history C. Difficile colitis.      DX-CHEST-FOR LINE PLACEMENT Perform procedure in: Patient's Room   Final Result      No pneumothorax identified following right IJ line placement      Mild hazy opacity is concerning for interstitial edema      Mild aortic ectasia      [id number] VIEW   Final Result      1.  Bowel gas pattern is nonobstructive.   2.  There is a question of colonic wall thickening in the right lower quadrant and left lower quadrant. This is nonspecific. This could be due to  inflammation or infection.   3.  There is a left hemipelvic calcification, possibly a calcified uterine fibroid.           Assessment/Plan  * Septic shock due to Clostridioides difficile (HCC)  Assessment & Plan  Resolved  Vanco/zosyn through 8/5/2021 per ID      Leukocytosis  Assessment & Plan  Improved significantly, WBC down to 13.9      Hypotension  Assessment & Plan  Resolved, now hypertension  Volume overload currently, IV lasix bid.       Hyponatremia  Assessment & Plan  Resolved      Full code status  Assessment & Plan  .      KARIS (acute kidney injury) (HCC)  Assessment & Plan  Resolved      History of pacemaker  Assessment & Plan  .      Pancolitis (HCC)  Assessment & Plan  Secondary to c diff colitis.   ID and surgery were following  No need for surgical intervention.            VTE prophylaxis: heparin ppx    I have performed a physical exam and reviewed and updated ROS and Plan today (7/29/2021). In review of yesterday's note (7/28/2021), there are no changes except as documented above.

## 2021-07-29 NOTE — CARE PLAN
The patient is Watcher - Medium risk of patient condition declining or worsening    Shift Goals  Clinical Goals: Monitor WBC and lung sounds  Patient Goals: rest  Family Goals: Support    Progress made toward(s) clinical / shift goals:  fluids d/c at this time.     Patient is not progressing towards the following goals:      Problem: Knowledge Deficit - Standard  Goal: Patient and family/care givers will demonstrate understanding of plan of care, disease process/condition, diagnostic tests and medications  Outcome: Progressing   Discuss plan of care and disease process with patient.

## 2021-07-29 NOTE — CARE PLAN
Problem: Knowledge Deficit - Standard  Goal: Patient and family/care givers will demonstrate understanding of plan of care, disease process/condition, diagnostic tests and medications  Outcome: Progressing     Problem: Hemodynamics  Goal: Patient's hemodynamics, fluid balance and neurologic status will be stable or improve  Outcome: Progressing  Pt still edematous. BLE elevated   The patient is Stable - Low risk of patient condition declining or worsening    Shift Goals  Clinical Goals: Monitor WBC and lung sounds  Patient Goals: rest  Family Goals: Support    Progress made toward(s) clinical / shift goals:  Lung sounds improving    Patient is not progressing towards the following goals:

## 2021-07-29 NOTE — ASSESSMENT & PLAN NOTE
Secondary to c diff colitis.   ID and surgery were following  No need for surgical intervention.

## 2021-07-29 NOTE — HOSPITAL COURSE
Very pleasant 89-year-old female with past medical history of pacemaker placement, hypertension, hyperlipidemia, hydrochlorothiazide, lisinopril, metoprolol, omeprazole, simvastatin transferred from [facility] in [place], [place] on 7/26/2021 for septic shock due to C. difficile pancolitis.  She presented there on 7/23 with complaints of weakness and diarrhea.     Prior to admission she had several days of soft normal colored stools without blood along with occasional nausea and nonbloody nonbilious emesis.  She was admitted and started on IV fluids and empiric antibiotics with vancomycin/Zosyn.  KARIS noted on admission.  She became hypotensive and febrile on hospital day #2, prompting labs showing a substantial leukocytosis and C. difficile positive stool.  CT abdomen/pelvis 7/25 revealed pancolitis without SBO or megacolon.  Diverticulosis without diverticulitis in the descending and sigmoid colon. IV antibiotics were discontinued and she was transitioned to oral vancomycin and IV Flagyl.  Blood cultures reportedly negative.  Hypotension was refractory to resuscitation she was started on IV vasopressors for hemodynamic support was subsequently transferred for higher level of care.  She has improved and is no longer needing pressors.  Central IV discontinued and she is now in slight volume overload so diuresing.

## 2021-07-30 LAB
ANION GAP SERPL CALC-SCNC: 9 MMOL/L (ref 7–16)
BUN SERPL-MCNC: 12 MG/DL (ref 8–22)
CALCIUM SERPL-MCNC: 8.7 MG/DL (ref 8.4–10.2)
CHLORIDE SERPL-SCNC: 106 MMOL/L (ref 96–112)
CO2 SERPL-SCNC: 23 MMOL/L (ref 20–33)
CREAT SERPL-MCNC: 0.86 MG/DL (ref 0.5–1.4)
ERYTHROCYTE [DISTWIDTH] IN BLOOD BY AUTOMATED COUNT: 45.1 FL (ref 35.9–50)
GLUCOSE SERPL-MCNC: 111 MG/DL (ref 65–99)
HCT VFR BLD AUTO: 29.3 % (ref 37–47)
HGB BLD-MCNC: 9.7 G/DL (ref 12–16)
MCH RBC QN AUTO: 30.8 PG (ref 27–33)
MCHC RBC AUTO-ENTMCNC: 33.1 G/DL (ref 33.6–35)
MCV RBC AUTO: 93 FL (ref 81.4–97.8)
PLATELET # BLD AUTO: 349 K/UL (ref 164–446)
PMV BLD AUTO: 9.6 FL (ref 9–12.9)
POTASSIUM SERPL-SCNC: 3.8 MMOL/L (ref 3.6–5.5)
RBC # BLD AUTO: 3.15 M/UL (ref 4.2–5.4)
SODIUM SERPL-SCNC: 138 MMOL/L (ref 135–145)
WBC # BLD AUTO: 14.1 K/UL (ref 4.8–10.8)

## 2021-07-30 PROCEDURE — 700101 HCHG RX REV CODE 250: Performed by: INTERNAL MEDICINE

## 2021-07-30 PROCEDURE — A9270 NON-COVERED ITEM OR SERVICE: HCPCS | Performed by: INTERNAL MEDICINE

## 2021-07-30 PROCEDURE — 94760 N-INVAS EAR/PLS OXIMETRY 1: CPT

## 2021-07-30 PROCEDURE — 700102 HCHG RX REV CODE 250 W/ 637 OVERRIDE(OP): Performed by: INTERNAL MEDICINE

## 2021-07-30 PROCEDURE — 700102 HCHG RX REV CODE 250 W/ 637 OVERRIDE(OP): Performed by: HOSPITALIST

## 2021-07-30 PROCEDURE — 99232 SBSQ HOSP IP/OBS MODERATE 35: CPT | Performed by: INTERNAL MEDICINE

## 2021-07-30 PROCEDURE — 80048 BASIC METABOLIC PNL TOTAL CA: CPT

## 2021-07-30 PROCEDURE — 97530 THERAPEUTIC ACTIVITIES: CPT

## 2021-07-30 PROCEDURE — 700111 HCHG RX REV CODE 636 W/ 250 OVERRIDE (IP): Performed by: HOSPITALIST

## 2021-07-30 PROCEDURE — 770020 HCHG ROOM/CARE - TELE (206)

## 2021-07-30 PROCEDURE — A9270 NON-COVERED ITEM OR SERVICE: HCPCS | Performed by: HOSPITALIST

## 2021-07-30 PROCEDURE — 700111 HCHG RX REV CODE 636 W/ 250 OVERRIDE (IP): Performed by: INTERNAL MEDICINE

## 2021-07-30 PROCEDURE — 97116 GAIT TRAINING THERAPY: CPT

## 2021-07-30 PROCEDURE — 85027 COMPLETE CBC AUTOMATED: CPT

## 2021-07-30 RX ORDER — FUROSEMIDE 10 MG/ML
20 INJECTION INTRAMUSCULAR; INTRAVENOUS
Status: DISCONTINUED | OUTPATIENT
Start: 2021-07-31 | End: 2021-08-02 | Stop reason: HOSPADM

## 2021-07-30 RX ADMIN — METRONIDAZOLE 500 MG: 500 INJECTION, SOLUTION INTRAVENOUS at 22:21

## 2021-07-30 RX ADMIN — VANCOMYCIN HYDROCHLORIDE 500 MG: KIT ORAL at 23:37

## 2021-07-30 RX ADMIN — VANCOMYCIN HYDROCHLORIDE 500 MG: KIT ORAL at 06:06

## 2021-07-30 RX ADMIN — HEPARIN SODIUM 5000 UNITS: 5000 INJECTION, SOLUTION INTRAVENOUS; SUBCUTANEOUS at 17:53

## 2021-07-30 RX ADMIN — SIMVASTATIN 10 MG: 10 TABLET, FILM COATED ORAL at 17:53

## 2021-07-30 RX ADMIN — METRONIDAZOLE 500 MG: 500 INJECTION, SOLUTION INTRAVENOUS at 14:04

## 2021-07-30 RX ADMIN — FUROSEMIDE 20 MG: 10 INJECTION, SOLUTION INTRAMUSCULAR; INTRAVENOUS at 06:08

## 2021-07-30 RX ADMIN — METRONIDAZOLE 500 MG: 500 INJECTION, SOLUTION INTRAVENOUS at 06:06

## 2021-07-30 RX ADMIN — BENZONATATE 100 MG: 100 CAPSULE ORAL at 02:31

## 2021-07-30 RX ADMIN — HEPARIN SODIUM 5000 UNITS: 5000 INJECTION, SOLUTION INTRAVENOUS; SUBCUTANEOUS at 06:00

## 2021-07-30 RX ADMIN — VANCOMYCIN HYDROCHLORIDE 500 MG: KIT ORAL at 12:38

## 2021-07-30 RX ADMIN — VANCOMYCIN HYDROCHLORIDE 500 MG: KIT ORAL at 17:53

## 2021-07-30 ASSESSMENT — COGNITIVE AND FUNCTIONAL STATUS - GENERAL
SUGGESTED CMS G CODE MODIFIER MOBILITY: CK
MOVING FROM LYING ON BACK TO SITTING ON SIDE OF FLAT BED: A LITTLE
STANDING UP FROM CHAIR USING ARMS: A LITTLE
TURNING FROM BACK TO SIDE WHILE IN FLAT BAD: A LITTLE
CLIMB 3 TO 5 STEPS WITH RAILING: A LOT
MOBILITY SCORE: 17
MOVING TO AND FROM BED TO CHAIR: A LITTLE
WALKING IN HOSPITAL ROOM: A LITTLE

## 2021-07-30 ASSESSMENT — ENCOUNTER SYMPTOMS
CLAUDICATION: 0
CONSTIPATION: 0
DIZZINESS: 0
SENSORY CHANGE: 0
BLURRED VISION: 0
COUGH: 0
INSOMNIA: 0
WEAKNESS: 0
DIARRHEA: 1
FEVER: 0
PHOTOPHOBIA: 0
VOMITING: 0
SHORTNESS OF BREATH: 0
CHILLS: 0
HEADACHES: 0
NERVOUS/ANXIOUS: 0
SPEECH CHANGE: 0
DEPRESSION: 0
HEARTBURN: 0
ABDOMINAL PAIN: 0
MYALGIAS: 0

## 2021-07-30 ASSESSMENT — PATIENT HEALTH QUESTIONNAIRE - PHQ9
2. FEELING DOWN, DEPRESSED, IRRITABLE, OR HOPELESS: NOT AT ALL
1. LITTLE INTEREST OR PLEASURE IN DOING THINGS: NOT AT ALL
SUM OF ALL RESPONSES TO PHQ9 QUESTIONS 1 AND 2: 0

## 2021-07-30 ASSESSMENT — PAIN DESCRIPTION - PAIN TYPE
TYPE: ACUTE PAIN
TYPE: ACUTE PAIN;CHRONIC PAIN

## 2021-07-30 ASSESSMENT — GAIT ASSESSMENTS
DISTANCE (FEET): 40
GAIT LEVEL OF ASSIST: MINIMAL ASSIST
ASSISTIVE DEVICE: FRONT WHEEL WALKER
DEVIATION: BRADYKINETIC

## 2021-07-30 NOTE — PROGRESS NOTES
Daughter Makeda called to talk to pt for about 30 minutes. Makeda asked for update.   With pt permission, called Makeda back and discussed POC for pt, awaiting SNF placement locally and then to work on transfer to SNF in [place], [place] area near rest of family. Makeda had no further questions about her mother's care.

## 2021-07-30 NOTE — DISCHARGE PLANNING
Anticipated Discharge Disposition:   SNF, [facility]    Action:   Chart review complete.     Per MD, patient medically cleared to discharge to SNF. Choice form given to patient and family yesterday.    0940: RN LEROY sent a voalte message to the bedside RN requesting that if she hears anything about SNF or sees the choice form filled out to please let the RN CM know.     1011: RN CM informed by bedside RN that she spoke with patient's daughter Susan. Susan, after speaking with patient's son's and patient, agreed upon the following SNF choices: 1- [facility], 2- [facility] and 3- [facility].     1015: RN LEROY met with patient to collect signature for above choices. Patient agreeable and gave signature.     1020: Choice form faxed to [facility]    PASRR: [id number]    1245: This patient has been accepted to [facility]. RN LEROY called Ivana with [facility]. Ivana to call RN CM back when they have more information of possible transport.     1255: RN CM received a call back from Ivana. They are able to take the patient tomorrow at 1330 via [facility] van. MD and bedside RN notified. Transfer packet started.     Barriers to Discharge:   Transfer packet completion     Plan:   Finish transport packet and give IMM   Hospital care management will continue to assist with discharge planning needs.

## 2021-07-30 NOTE — CARE PLAN
Problem: Knowledge Deficit - Standard  Goal: Patient and family/care givers will demonstrate understanding of plan of care, disease process/condition, diagnostic tests and medications  Outcome: Progressing     Problem: Fall Risk  Goal: Patient will remain free from falls  Outcome: Progressing     Problem: Hemodynamics  Goal: Patient's hemodynamics, fluid balance and neurologic status will be stable or improve  Outcome: Progressing     Problem: Fluid Volume  Goal: Fluid volume balance will be maintained  Outcome: Progressing     Problem: Urinary - Renal Perfusion  Goal: Ability to achieve and maintain adequate renal perfusion and functioning will improve  Outcome: Progressing     Problem: Respiratory  Goal: Patient will achieve/maintain optimum respiratory ventilation and gas exchange  Outcome: Progressing     Problem: Skin Integrity  Goal: Skin integrity is maintained or improved  Outcome: Progressing   The patient is Stable - Low risk of patient condition declining or worsening    Shift Goals  Clinical Goals: Monitor WBC and lung sounds  Patient Goals: rest  Family Goals: Support    Progress made toward(s) clinical / shift goals:  Good progress    Patient is not progressing towards the following goals: n/a

## 2021-07-30 NOTE — PROGRESS NOTES
Spoke with pts daughter Makeda to know if they had reached a decision on SNF choices. Makeda provided the list of choices, choices given to LEROY Marie.

## 2021-07-30 NOTE — PROGRESS NOTES
Telemetry Shift Summary    Rhythm VPOD  HR Range 60-70  Ectopy Rare PVC/coup/big/trig  Measurements -/-/-        Normal Values  Rhythm SR  HR Range    Measurements 0.12-0.20 / 0.06-0.10  / 0.30-0.52

## 2021-07-30 NOTE — DISCHARGE PLANNING
Received Choice form at 1210  Agency[facility]Facility Name: [facility]), [facility]), [facility]  Referral sent per Choice form @ 1210    RN CM informed       Received Choice form at 1215  Agency/Facility Name: [facility](1), [facility](2), [facility] (3)  Referral sent per Choice form @ 1215    RN CM informed

## 2021-07-30 NOTE — PROGRESS NOTES
Completed morning assessment. Pt agreeable to transfer to cardiac chair for breakfast. Pt weaned off of oxygen, room air saturation 94%. Pt denies any other needs at this time.

## 2021-07-30 NOTE — CARE PLAN
Problem: Respiratory  Goal: Patient will achieve/maintain optimum respiratory ventilation and gas exchange  Outcome: Progressing  Note: Pt currently on room air without any complaints of SOB. Pt o2 maintains above 90%.      Problem: Skin Integrity  Goal: Skin integrity is maintained or improved  Outcome: Progressing  Note: Pt encouraged to mobilize during the day to reduce risk of skin break down. Pt agreed to get up into cardiac chair for breakfast.    The patient is Stable - Low risk of patient condition declining or worsening    Shift Goals  Clinical Goals: mobility, wean o2  Patient Goals: rest, feel better  Family Goals: Support    Progress made toward(s) clinical / shift goals:  pt up in cardiac chair.    Patient is not progressing towards the following goals:

## 2021-07-30 NOTE — THERAPY
Physical Therapy   Daily Treatment     Patient Name: Cesia Franklin  Age:  89 y.o., Sex:  female  Medical Record #: [id number]  Today's Date: 7/30/2021     Precautions: Fall Risk (contact pxs for Ddiff)    Assessment    R knee ROM 0-110  Improved strength ,transfers and gait,Pt is motivated    Plan    Continue current treatment plan.      07/30/21 1500   Total Time Spent   Total Time Spent (Mins) 30   Charge Group   PT Gait Training 1   PT Therapeutic Activities 1   Sitting Lower Body Exercises   Sitting Lower Body Exercises Yes   Balance   Sitting Balance (Static) Fair +   Sitting Balance (Dynamic) Fair +   Standing Balance (Static) Fair   Standing Balance (Dynamic) Fair   Weight Shift Sitting Fair   Weight Shift Standing Fair   Gait Analysis   Gait Level Of Assist Minimal Assist   Assistive Device Front Wheel Walker   Distance (Feet) 40   # of Times Distance was Traveled 1   Deviation Bradykinetic   # of Stairs Climbed 0   Weight Bearing Status full   Functional Mobility   Sit to Stand Minimal Assist   Bed, Chair, Wheelchair Transfer Minimal Assist   Transfer Method Stand Step   How much difficulty does the patient currently have...   Turning over in bed (including adjusting bedclothes, sheets and blankets)? 3   Sitting down on and standing up from a chair with arms (e.g., wheelchair, bedside commode, etc.) 3   Moving from lying on back to sitting on the side of the bed? 3   How much help from another person does the patient currently need...   Moving to and from a bed to a chair (including a wheelchair)? 3   Need to walk in a hospital room? 3   Climbing 3-5 steps with a railing? 2   6 clicks Mobility Score 17   Activity Tolerance   Sitting in Chair > 1hr   Sitting Edge of Bed 10   Standing 10   Short Term Goals    Short Term Goal # 1 Pt will be able to perform bed mobility and sup <> sit Antonio in 6 visits.,   Goal Outcome # 1 Progressing as expected   Short Term Goal # 2 Pt will be able to perform sit <> stand and  transfer Antonio in 6 visits so can DC home safely.   Goal Outcome # 2 Progressing as expected   Short Term Goal # 3 Pt will be able to ambulate 200 ft with FWW Antonio in 6 visits so canDC home safely.   Goal Outcome # 3 Progressing as expected   Anticipated Discharge Equipment and Recommendations   Discharge Recommendations Recommend post-acute placement for additional physical therapy services prior to discharge home   Interdisciplinary Plan of Care Collaboration   IDT Collaboration with  Nursing   Session Information   Date / Session Number  7/30-2 2/5 8/3       DC Equipment Recommendations: Unable to determine at this time  Discharge Recommendations: (P) Recommend post-acute placement for additional physical therapy services prior to discharge home

## 2021-07-30 NOTE — PROGRESS NOTES
Gave report and transferred care to LYN Her.   Pt is comfortably dozing in bed after med pass, flagyl abx will be done very shortly.   VSS, pt has no current or acute needs. Discussed POC with LYN Her.   Rindge fall and safety precautions in place, care transferred.

## 2021-07-30 NOTE — PROGRESS NOTES
Monitor Summary     Rhythm:paced 64-69  Measurements: paced   ECTOPIES: rpvc, rtrigem        Normal Values  Rhythm SR  HR Range    Measurements 0.12-0.20 / 0.06-0.10  / 0.30-0.52

## 2021-07-30 NOTE — PROGRESS NOTES
"Hospital Medicine Daily Progress Note    Date of Service  7/30/2021    Chief Complaint  Cesia Franklin is a 89 y.o. female admitted 7/26/2021 with sepsis and diarrhea.     Hospital Course  Very pleasant 89-year-old female with past medical history of pacemaker placement, hypertension, hyperlipidemia, hydrochlorothiazide, lisinopril, metoprolol, omeprazole, simvastatin transferred from [facility] in [place], [place] on 7/26/2021 for septic shock due to C. difficile pancolitis.  She presented there on 7/23 with complaints of weakness and diarrhea.     Prior to admission she had several days of soft normal colored stools without blood along with occasional nausea and nonbloody nonbilious emesis.  She was admitted and started on IV fluids and empiric antibiotics with vancomycin/Zosyn.  KARIS noted on admission.  She became hypotensive and febrile on hospital day #2, prompting labs showing a substantial leukocytosis and C. difficile positive stool.  CT abdomen/pelvis 7/25 revealed pancolitis without SBO or megacolon.  Diverticulosis without diverticulitis in the descending and sigmoid colon. IV antibiotics were discontinued and she was transitioned to oral vancomycin and IV Flagyl.  Blood cultures reportedly negative.  Hypotension was refractory to resuscitation she was started on IV vasopressors for hemodynamic support was subsequently transferred for higher level of care.  She has improved and is no longer needing pressors.  Central IV discontinued and she is now in slight volume overload so diuresing.        Interval Problem Update  7/29 Patient feeling okay but still remains very weak and PT is recommending SNF placement.  She is still having loose stools but they are slightly more formed like \"pudding\".  I will advance her diet to GI soft.    7/30 Patient up to chair and feeling slightly stronger.  She is still having loose bowel movements and the character hasn't changed.  She is " tolerating a GI soft diet and appetite is improving.  She denies any pain.     I have personally seen and examined the patient at bedside. I discussed the plan of care with patient, bedside RN, charge RN,  and pharmacy.    Consultants/Specialty  critical care s/o  ID s/o    Code Status  Full Code    Disposition  Patient is not medically cleared.   Anticipate discharge to to skilled nursing facility.  I have placed the appropriate orders for post-discharge needs.    Review of Systems  Review of Systems   Constitutional: Negative for chills and fever.   HENT: Negative for congestion.    Eyes: Negative for blurred vision and photophobia.   Respiratory: Negative for cough and shortness of breath.    Cardiovascular: Negative for chest pain, claudication and leg swelling.   Gastrointestinal: Positive for diarrhea. Negative for abdominal pain, constipation, heartburn and vomiting.   Genitourinary: Negative for dysuria and hematuria.   Musculoskeletal: Negative for joint pain and myalgias.   Skin: Negative for itching and rash.   Neurological: Negative for dizziness, sensory change, speech change, weakness and headaches.   Psychiatric/Behavioral: Negative for depression. The patient is not nervous/anxious and does not have insomnia.         Physical Exam  Temp:  [36.3 °C (97.3 °F)-36.6 °C (97.9 °F)] 36.6 °C (97.8 °F)  Pulse:  [62-67] 62  Resp:  [16-18] 18  BP: (118-145)/(59-73) 121/65  SpO2:  [95 %-99 %] 97 %    Physical Exam  Vitals and nursing note reviewed.   Constitutional:       General: She is not in acute distress.     Appearance: Normal appearance. She is not ill-appearing.   HENT:      Head: Normocephalic and atraumatic.      Nose: Nose normal.   Cardiovascular:      Rate and Rhythm: Normal rate and regular rhythm.      Heart sounds: Normal heart sounds. No murmur heard.     Pulmonary:      Effort: Pulmonary effort is normal.      Breath sounds: Normal breath sounds.   Abdominal:      General: Bowel  sounds are normal. There is no distension.      Palpations: Abdomen is soft.   Musculoskeletal:         General: No swelling or tenderness.      Cervical back: Neck supple.   Skin:     General: Skin is warm and dry.   Neurological:      General: No focal deficit present.      Mental Status: She is alert and oriented to person, place, and time.   Psychiatric:         Mood and Affect: Mood normal.         Fluids    Intake/Output Summary (Last 24 hours) at 7/30/2021 1155  Last data filed at 7/29/2021 2255  Gross per 24 hour   Intake 120 ml   Output 1000 ml   Net -880 ml       Laboratory  Recent Labs     07/28/21 0418 07/29/21  0017 07/30/21  0044   WBC 21.3* 13.9* 14.1*   RBC 2.99* 3.24* 3.15*   HEMOGLOBIN 9.2* 9.9* 9.7*   HEMATOCRIT 28.4* 30.7* 29.3*   MCV 95.0 94.8 93.0   MCH 30.8 30.6 30.8   MCHC 32.4* 32.2* 33.1*   RDW 46.8 46.5 45.1   PLATELETCT 309 327 349   MPV 9.5 9.5 9.6     Recent Labs     07/28/21  0418 07/29/21  0017 07/30/21  0044   SODIUM 134* 135 138   POTASSIUM 4.8 4.3 3.8   CHLORIDE 105 106 106   CO2 18* 21 23   GLUCOSE 81 114* 111*   BUN 15 14 12   CREATININE 0.90 0.84 0.86   CALCIUM 8.8 8.9 8.7                   Imaging  DX-CHEST-PORTABLE (1 VIEW)   Final Result      1.  Cardiomegaly.   2.  Bilateral pulmonary opacities most likely pulmonary edema/CHF.   3.  Possible small left pleural effusion.      [id number] VIEW   Final Result      Nonobstructive bowel gas pattern.      OUTSIDE IMAGES-CT ABDOMEN /PELVIS   Final Result      [id number] VIEW   Final Result      1.  No evidence of bowel obstruction.      2.  Likely areas of wall thickening involving the colon consistent with patient's history C. Difficile colitis.      DX-CHEST-FOR LINE PLACEMENT Perform procedure in: Patient's Room   Final Result      No pneumothorax identified following right IJ line placement      Mild hazy opacity is concerning for interstitial edema      Mild aortic ectasia      [id number] VIEW   Final Result      1.   Bowel gas pattern is nonobstructive.   2.  There is a question of colonic wall thickening in the right lower quadrant and left lower quadrant. This is nonspecific. This could be due to inflammation or infection.   3.  There is a left hemipelvic calcification, possibly a calcified uterine fibroid.           Assessment/Plan  * Septic shock due to Clostridioides difficile (HCC)  Assessment & Plan  Resolved  Vanco/zosyn through 8/8/2021 per ID      Leukocytosis  Assessment & Plan  Improved significantly, WBC stable      Hypotension  Assessment & Plan  Resolved, now hypertension  Volume overload improving, IV lasix daily        Hyponatremia  Assessment & Plan  Resolved      Full code status  Assessment & Plan  .      KARIS (acute kidney injury) (HCC)  Assessment & Plan  Resolved      History of pacemaker  Assessment & Plan  .      Pancolitis (HCC)  Assessment & Plan  Secondary to c diff colitis.   ID and surgery were following  No need for surgical intervention.          VTE prophylaxis: heparin ppx    I have performed a physical exam and reviewed and updated ROS and Plan today (7/30/2021). In review of yesterday's note (7/29/2021), there are no changes except as documented above.

## 2021-07-31 LAB
BACTERIA BLD CULT: NORMAL
BACTERIA BLD CULT: NORMAL
SIGNIFICANT IND 70042: NORMAL
SIGNIFICANT IND 70042: NORMAL
SITE SITE: NORMAL
SITE SITE: NORMAL
SOURCE SOURCE: NORMAL
SOURCE SOURCE: NORMAL

## 2021-07-31 PROCEDURE — 94760 N-INVAS EAR/PLS OXIMETRY 1: CPT

## 2021-07-31 PROCEDURE — A9270 NON-COVERED ITEM OR SERVICE: HCPCS | Performed by: INTERNAL MEDICINE

## 2021-07-31 PROCEDURE — 700111 HCHG RX REV CODE 636 W/ 250 OVERRIDE (IP): Performed by: INTERNAL MEDICINE

## 2021-07-31 PROCEDURE — 700102 HCHG RX REV CODE 250 W/ 637 OVERRIDE(OP): Performed by: INTERNAL MEDICINE

## 2021-07-31 PROCEDURE — A9270 NON-COVERED ITEM OR SERVICE: HCPCS | Performed by: HOSPITALIST

## 2021-07-31 PROCEDURE — 99232 SBSQ HOSP IP/OBS MODERATE 35: CPT | Performed by: INTERNAL MEDICINE

## 2021-07-31 PROCEDURE — 700101 HCHG RX REV CODE 250: Performed by: INTERNAL MEDICINE

## 2021-07-31 PROCEDURE — 770020 HCHG ROOM/CARE - TELE (206)

## 2021-07-31 PROCEDURE — 700102 HCHG RX REV CODE 250 W/ 637 OVERRIDE(OP): Performed by: HOSPITALIST

## 2021-07-31 RX ORDER — SIMVASTATIN 10 MG
10 TABLET ORAL EVERY EVENING
Qty: 30 TABLET | Refills: 11 | Status: SHIPPED | OUTPATIENT
Start: 2021-07-31

## 2021-07-31 RX ORDER — BENZONATATE 100 MG/1
100 CAPSULE ORAL 3 TIMES DAILY PRN
Qty: 60 CAPSULE | Refills: 0 | Status: SHIPPED | OUTPATIENT
Start: 2021-07-31

## 2021-07-31 RX ORDER — ONDANSETRON 4 MG/1
4 TABLET, ORALLY DISINTEGRATING ORAL EVERY 4 HOURS PRN
Qty: 10 TABLET | Refills: 0 | Status: SHIPPED | OUTPATIENT
Start: 2021-07-31

## 2021-07-31 RX ORDER — ACETAMINOPHEN 325 MG/1
650 TABLET ORAL EVERY 6 HOURS PRN
Qty: 30 TABLET | Refills: 0 | Status: SHIPPED | OUTPATIENT
Start: 2021-07-31

## 2021-07-31 RX ORDER — FUROSEMIDE 20 MG/1
20 TABLET ORAL 2 TIMES DAILY
Qty: 60 TABLET | Status: SHIPPED
Start: 2021-07-31

## 2021-07-31 RX ORDER — METRONIDAZOLE 500 MG/1
500 TABLET ORAL EVERY 8 HOURS
Qty: 27 TABLET | Refills: 0 | Status: SHIPPED
Start: 2021-07-31 | End: 2021-08-09

## 2021-07-31 RX ORDER — IPRATROPIUM BROMIDE AND ALBUTEROL SULFATE 2.5; .5 MG/3ML; MG/3ML
3 SOLUTION RESPIRATORY (INHALATION) EVERY 4 HOURS PRN
Status: SHIPPED
Start: 2021-07-31

## 2021-07-31 RX ADMIN — HEPARIN SODIUM 5000 UNITS: 5000 INJECTION, SOLUTION INTRAVENOUS; SUBCUTANEOUS at 17:38

## 2021-07-31 RX ADMIN — BENZONATATE 100 MG: 100 CAPSULE ORAL at 22:46

## 2021-07-31 RX ADMIN — METRONIDAZOLE 500 MG: 500 INJECTION, SOLUTION INTRAVENOUS at 05:57

## 2021-07-31 RX ADMIN — VANCOMYCIN HYDROCHLORIDE 500 MG: KIT ORAL at 17:38

## 2021-07-31 RX ADMIN — VANCOMYCIN HYDROCHLORIDE 500 MG: KIT ORAL at 05:56

## 2021-07-31 RX ADMIN — SIMVASTATIN 10 MG: 10 TABLET, FILM COATED ORAL at 17:38

## 2021-07-31 RX ADMIN — METRONIDAZOLE 500 MG: 500 INJECTION, SOLUTION INTRAVENOUS at 22:46

## 2021-07-31 RX ADMIN — METRONIDAZOLE 500 MG: 500 INJECTION, SOLUTION INTRAVENOUS at 13:17

## 2021-07-31 RX ADMIN — VANCOMYCIN HYDROCHLORIDE 500 MG: KIT ORAL at 13:16

## 2021-07-31 RX ADMIN — HEPARIN SODIUM 5000 UNITS: 5000 INJECTION, SOLUTION INTRAVENOUS; SUBCUTANEOUS at 05:56

## 2021-07-31 RX ADMIN — FUROSEMIDE 20 MG: 10 INJECTION, SOLUTION INTRAMUSCULAR; INTRAVENOUS at 05:57

## 2021-07-31 ASSESSMENT — ENCOUNTER SYMPTOMS
INSOMNIA: 0
NERVOUS/ANXIOUS: 0
BLURRED VISION: 0
SENSORY CHANGE: 0
CONSTIPATION: 0
FEVER: 0
VOMITING: 0
CHILLS: 0
COUGH: 0
DEPRESSION: 0
DIARRHEA: 1
ABDOMINAL PAIN: 0
SPEECH CHANGE: 0
HEARTBURN: 0
SHORTNESS OF BREATH: 0
DIZZINESS: 0
PHOTOPHOBIA: 0
WEAKNESS: 0
HEADACHES: 0
MYALGIAS: 0
CLAUDICATION: 0

## 2021-07-31 ASSESSMENT — FIBROSIS 4 INDEX: FIB4 SCORE: 0.48

## 2021-07-31 ASSESSMENT — PAIN DESCRIPTION - PAIN TYPE
TYPE: ACUTE PAIN
TYPE: ACUTE PAIN;CHRONIC PAIN
TYPE: ACUTE PAIN;CHRONIC PAIN

## 2021-07-31 NOTE — DISCHARGE SUMMARY
Discharge Summary    CHIEF COMPLAINT ON ADMISSION  No chief complaint on file.      Reason for Admission  Hyponatremia, C-Diff, Sepsis     CODE STATUS  Full Code    HPI & HOSPITAL COURSE  Very pleasant 89-year-old female with past medical history of pacemaker placement, hypertension, hyperlipidemia, hydrochlorothiazide, lisinopril, metoprolol, omeprazole, simvastatin transferred from [facility] in [place], [place] on 7/26/2021 for septic shock due to C. difficile pancolitis.  She presented there on 7/23 with complaints of weakness and diarrhea.     Prior to admission she had several days of soft normal colored stools without blood along with occasional nausea and nonbloody nonbilious emesis.  She was admitted and started on IV fluids and empiric antibiotics with vancomycin/Zosyn.  KARIS noted on admission.  She became hypotensive and febrile on hospital day #2, prompting labs showing a substantial leukocytosis and C. difficile positive stool.  CT abdomen/pelvis 7/25 revealed pancolitis without SBO or megacolon.  Diverticulosis without diverticulitis in the descending and sigmoid colon. IV antibiotics were discontinued and she was transitioned to oral vancomycin and IV Flagyl.  Blood cultures reportedly negative.  Hypotension was refractory to resuscitation she was started on IV vasopressors for hemodynamic support was subsequently transferred for higher level of care.  She has improved and is no longer needing pressors.  Central IV discontinued and she is now in slight volume overload so diuresing gently.  She is requiring less oxygen and should be able to wean from oxygen over the next few days with continued gentle diuresis.  She has until 8/9/2021 with flagyl and vanco for treatment of her c diff.  She has been working with therapies and will continue to do so at SNF so she can return home to [place] with support from family.      Therefore, she is discharged in good and stable  condition to skilled nursing facility.    The patient met 2-midnight criteria for an inpatient stay at the time of discharge.      FOLLOW UP ITEMS POST DISCHARGE  PCP upon return home    DISCHARGE DIAGNOSES  Principal Problem:    Septic shock due to Clostridioides difficile (HCC) POA: Unknown  Active Problems:    Pancolitis (HCC) POA: Unknown    History of pacemaker POA: Unknown    KARIS (acute kidney injury) ([facility]) POA: Unknown    Full code status POA: Unknown    Hyponatremia POA: Unknown    Hypotension POA: Unknown    Leukocytosis POA: Unknown  Resolved Problems:    * No resolved hospital problems. *      FOLLOW UP  No future appointments.  No follow-up provider specified.    MEDICATIONS ON DISCHARGE     Medication List      START taking these medications      Instructions   acetaminophen 325 MG Tabs  Commonly known as: Tylenol   Take 2 Tablets by mouth every 6 hours as needed.  Dose: 650 mg     benzonatate 100 MG Caps  Commonly known as: TESSALON   Take 1 capsule by mouth 3 times a day as needed for Cough.  Dose: 100 mg     furosemide 20 MG Tabs  Commonly known as: LASIX   Take 1 tablet by mouth 2 times a day.  Dose: 20 mg     ipratropium-albuterol 0.5-2.5 (3) MG/3ML nebulizer solution  Commonly known as: DUONEB   Take 3 mL by nebulization every four hours as needed for Shortness of Breath.  Dose: 3 mL     metroNIDAZOLE 500 MG Tabs  Commonly known as: FLAGYL   Take 1 tablet by mouth every 8 hours for 9 days.  Dose: 500 mg     ondansetron 4 MG Tbdp  Commonly known as: ZOFRAN ODT   Take 1 tablet by mouth every four hours as needed for Nausea (give PO if IV route is unavailable.).  Dose: 4 mg     simvastatin 10 MG Tabs  Commonly known as: ZOCOR   Take 1 tablet by mouth every evening.  Dose: 10 mg     vancomycin 50 mg/mL 50 mg/mL Soln   Take 10 mL by mouth every 6 hours.  Dose: 500 mg            Allergies  No Known Allergies    DIET  Orders Placed This Encounter   Procedures   • Diet Order Diet: Low Fiber(GI Soft)      Standing Status:   Standing     Number of Occurrences:   1     Order Specific Question:   Diet:     Answer:   Low Fiber(GI Soft) [2]       ACTIVITY  As tolerated.  Weight bearing as tolerated    LINES, DRAINS, AND WOUNDS  This is an automated list. Peripheral IVs will be removed prior to discharge.  Peripheral IV 07/30/21 20 G Anterior;Right Forearm (Active)   Site Assessment Clean;Dry;Intact 07/31/21 0247   Dressing Type Occlusive;Transparent 07/31/21 0247   Line Status Infusing 07/31/21 0247   Dressing Status Clean;Dry;Intact 07/31/21 0247   Dressing Intervention N/A 07/31/21 0247   Infiltration Grading ([facility]) 0 07/31/21 0247   Phlebitis Scale ([facility] Only) 0 07/31/21 0247          Peripheral IV 07/30/21 20 G Anterior;Right Forearm (Active)   Site Assessment Clean;Dry;Intact 07/31/21 0247   Dressing Type Occlusive;Transparent 07/31/21 0247   Line Status Infusing 07/31/21 0247   Dressing Status Clean;Dry;Intact 07/31/21 0247   Dressing Intervention N/A 07/31/21 0247   Infiltration Grading ([facility], [facility]) 0 07/31/21 0247   Phlebitis Scale ([facility] Only) 0 07/31/21 0247               MENTAL STATUS ON TRANSFER             CONSULTATIONS  Dr Forde - Critical care  Dr Hyde - ID    PROCEDURES  7/26 - Central line    LABORATORY  Lab Results   Component Value Date    SODIUM 138 07/30/2021    POTASSIUM 3.8 07/30/2021    CHLORIDE 106 07/30/2021    CO2 23 07/30/2021    GLUCOSE 111 (H) 07/30/2021    BUN 12 07/30/2021    CREATININE 0.86 07/30/2021        Lab Results   Component Value Date    WBC 14.1 (H) 07/30/2021    HEMOGLOBIN 9.7 (L) 07/30/2021    HEMATOCRIT 29.3 (L) 07/30/2021    PLATELETCT 349 07/30/2021        Total time of the discharge process exceeds 35 minutes.

## 2021-07-31 NOTE — CARE PLAN
Problem: Knowledge Deficit - Standard  Goal: Patient and family/care givers will demonstrate understanding of plan of care, disease process/condition, diagnostic tests and medications  Outcome: Progressing     Problem: Fall Risk  Goal: Patient will remain free from falls  Outcome: Progressing     Problem: Hemodynamics  Goal: Patient's hemodynamics, fluid balance and neurologic status will be stable or improve  Outcome: Progressing     Problem: Fluid Volume  Goal: Fluid volume balance will be maintained  Outcome: Progressing   The patient is Stable - Low risk of patient condition declining or worsening    Shift Goals  Clinical Goals: wean O2, rest, discharge  Patient Goals: rest, discharge,   Family Goals: rest, support,    Progress made toward(s) clinical / shift goals:  pt ready for discharge/transfer to [facility] today at 1330. Good UO for shift    Patient is not progressing towards the following goals: n/a

## 2021-07-31 NOTE — PROGRESS NOTES
"Hospital Medicine Daily Progress Note    Date of Service  7/31/2021    Chief Complaint  Cesia Franklin is a 89 y.o. female admitted 7/26/2021 with sepsis and diarrhea.     Hospital Course  Very pleasant 89-year-old female with past medical history of pacemaker placement, hypertension, hyperlipidemia, hydrochlorothiazide, lisinopril, metoprolol, omeprazole, simvastatin transferred from [facility] in [place], [place] on 7/26/2021 for septic shock due to C. difficile pancolitis.  She presented there on 7/23 with complaints of weakness and diarrhea.     Prior to admission she had several days of soft normal colored stools without blood along with occasional nausea and nonbloody nonbilious emesis.  She was admitted and started on IV fluids and empiric antibiotics with vancomycin/Zosyn.  KARIS noted on admission.  She became hypotensive and febrile on hospital day #2, prompting labs showing a substantial leukocytosis and C. difficile positive stool.  CT abdomen/pelvis 7/25 revealed pancolitis without SBO or megacolon.  Diverticulosis without diverticulitis in the descending and sigmoid colon. IV antibiotics were discontinued and she was transitioned to oral vancomycin and IV Flagyl.  Blood cultures reportedly negative.  Hypotension was refractory to resuscitation she was started on IV vasopressors for hemodynamic support was subsequently transferred for higher level of care.  She has improved and is no longer needing pressors.  Central IV discontinued and she is now in slight volume overload so diuresing.        Interval Problem Update  7/29 Patient feeling okay but still remains very weak and PT is recommending SNF placement.  She is still having loose stools but they are slightly more formed like \"pudding\".  I will advance her diet to GI soft.    7/30 Patient up to chair and feeling slightly stronger.  She is still having loose bowel movements and the character hasn't changed.  She is " tolerating a GI soft diet and appetite is improving.  She denies any pain.   7/31 Patient doing okay today, was supposed to transition to [facility] today but they don't have an isolation bed available today.     I have personally seen and examined the patient at bedside. I discussed the plan of care with patient, bedside RN, charge RN,  and pharmacy.    Consultants/Specialty  critical care s/o  ID s/o    Code Status  Full Code    Disposition  Patient is not medically cleared.   Anticipate discharge to to skilled nursing facility.  I have placed the appropriate orders for post-discharge needs.    Review of Systems  Review of Systems   Constitutional: Negative for chills and fever.   HENT: Negative for congestion.    Eyes: Negative for blurred vision and photophobia.   Respiratory: Negative for cough and shortness of breath.    Cardiovascular: Negative for chest pain, claudication and leg swelling.   Gastrointestinal: Positive for diarrhea. Negative for abdominal pain, constipation, heartburn and vomiting.   Genitourinary: Negative for dysuria and hematuria.   Musculoskeletal: Negative for joint pain and myalgias.   Skin: Negative for itching and rash.   Neurological: Negative for dizziness, sensory change, speech change, weakness and headaches.   Psychiatric/Behavioral: Negative for depression. The patient is not nervous/anxious and does not have insomnia.         Physical Exam  Temp:  [36.5 °C (97.7 °F)-36.9 °C (98.4 °F)] 36.6 °C (97.9 °F)  Pulse:  [63-70] 65  Resp:  [16-18] 18  BP: (116-140)/(59-63) 130/63  SpO2:  [91 %-98 %] 91 %    Physical Exam  Vitals and nursing note reviewed.   Constitutional:       General: She is not in acute distress.     Appearance: Normal appearance. She is not ill-appearing.   HENT:      Head: Normocephalic and atraumatic.      Nose: Nose normal.   Cardiovascular:      Rate and Rhythm: Normal rate and regular rhythm.      Heart sounds: Normal heart sounds. No murmur heard.      Pulmonary:      Effort: Pulmonary effort is normal.      Breath sounds: Normal breath sounds.   Abdominal:      General: Bowel sounds are normal. There is no distension.      Palpations: Abdomen is soft.   Musculoskeletal:         General: No swelling or tenderness.      Cervical back: Neck supple.   Skin:     General: Skin is warm and dry.   Neurological:      General: No focal deficit present.      Mental Status: She is alert and oriented to person, place, and time.   Psychiatric:         Mood and Affect: Mood normal.         Fluids    Intake/Output Summary (Last 24 hours) at 7/31/2021 1306  Last data filed at 7/31/2021 0748  Gross per 24 hour   Intake 600 ml   Output 1100 ml   Net -500 ml       Laboratory  Recent Labs     07/29/21  0017 07/30/21  0044   WBC 13.9* 14.1*   RBC 3.24* 3.15*   HEMOGLOBIN 9.9* 9.7*   HEMATOCRIT 30.7* 29.3*   MCV 94.8 93.0   MCH 30.6 30.8   MCHC 32.2* 33.1*   RDW 46.5 45.1   PLATELETCT 327 349   MPV 9.5 9.6     Recent Labs     07/29/21  0017 07/30/21  0044   SODIUM 135 138   POTASSIUM 4.3 3.8   CHLORIDE 106 106   CO2 21 23   GLUCOSE 114* 111*   BUN 14 12   CREATININE 0.84 0.86   CALCIUM 8.9 8.7                   Imaging  DX-CHEST-PORTABLE (1 VIEW)   Final Result      1.  Cardiomegaly.   2.  Bilateral pulmonary opacities most likely pulmonary edema/CHF.   3.  Possible small left pleural effusion.      [id number] VIEW   Final Result      Nonobstructive bowel gas pattern.      OUTSIDE IMAGES-CT ABDOMEN /PELVIS   Final Result      [id number] VIEW   Final Result      1.  No evidence of bowel obstruction.      2.  Likely areas of wall thickening involving the colon consistent with patient's history C. Difficile colitis.      DX-CHEST-FOR LINE PLACEMENT Perform procedure in: Patient's Room   Final Result      No pneumothorax identified following right IJ line placement      Mild hazy opacity is concerning for interstitial edema      Mild aortic ectasia      [id number] VIEW   Final  Result      1.  Bowel gas pattern is nonobstructive.   2.  There is a question of colonic wall thickening in the right lower quadrant and left lower quadrant. This is nonspecific. This could be due to inflammation or infection.   3.  There is a left hemipelvic calcification, possibly a calcified uterine fibroid.           Assessment/Plan  * Septic shock due to Clostridioides difficile (HCC)  Assessment & Plan  Resolved  Vanco/flagyl through 8/8/2021 per ID      Leukocytosis  Assessment & Plan  Improved significantly, WBC stable      Hypotension  Assessment & Plan  Resolved, now hypertension  Volume overload improving, IV lasix daily        Hyponatremia  Assessment & Plan  Resolved      Full code status  Assessment & Plan  .      KARIS (acute kidney injury) (HCC)  Assessment & Plan  Resolved      History of pacemaker  Assessment & Plan  .      Pancolitis (HCC)  Assessment & Plan  Secondary to c diff colitis.   ID and surgery were following  No need for surgical intervention.          VTE prophylaxis: heparin ppx    I have performed a physical exam and reviewed and updated ROS and Plan today (7/31/2021). In review of yesterday's note (7/30/2021), there are no changes except as documented above.

## 2021-07-31 NOTE — PROGRESS NOTES
Telemetry Shift Summary    Rhythm 100% VPOD  HR Range 58-69  Ectopy R- PVC, coup, big, trig  Measurements -/0.12/-      Normal Values  Rhythm SR  HR Range    Measurements 0.12-0.20 / 0.06-0.10  / 0.30-0.52

## 2021-07-31 NOTE — PROGRESS NOTES
Called daughter Makeda back with pt permission. Left  for nonurgent call back later tonight or tomorrow after 0800.

## 2021-07-31 NOTE — CARE PLAN
Problem: Knowledge Deficit - Standard  Goal: Patient and family/care givers will demonstrate understanding of plan of care, disease process/condition, diagnostic tests and medications  Outcome: Progressing  Note: Patient updated on her plan of care and the next steps to take which are placement in a skilled nursing facility.      Problem: Gastrointestinal Irritability  Goal: Diarrhea will be absent or improved  Outcome: Progressing  Note: Patient had a small, soft stool today which is more formed than previous days. No bouts of diarrhea or abdominal discomfort noted today.     Problem: Discharge Barriers/Planning  Goal: Patient's continuum of care needs are met  Outcome: Progressing  Note: Patient's main barrier to discharge is lack of availability at the skilled nursing facility. A bed should be available by tomorrow or Monday.     The patient is Stable - Low risk of patient condition declining or worsening    Shift Goals  Clinical Goals: wean O2, rest, placement  Patient Goals: rest, discharge  Family Goals: rest, support,    Progress made toward(s) clinical / shift goals:  Patient awaiting placement at [facility]. Patient no longer experiencing diarrhea or stool frequency, patient is aware and up to date on plan of care.    Patient is not progressing towards the following goals:

## 2021-07-31 NOTE — PROGRESS NOTES
Called the patient's daughter Susan and left a voicemail explaining that the patient's transfer to [facility] has been delayed to tomorrow or Monday. Also gave a brief update on the patient's status and informed her to call me back if she had any questions.

## 2021-07-31 NOTE — DISCHARGE PLANNING
Agency/Facility Name: [facility]  Spoke To: Ivana  Outcome: Needs to know if patients BM's have been formed. Was unaware patient was on isolation for cdiff.    @0900, Spoke to Ivana, notified of status of pt bowels. Ivana will speak with DON and update DPA.     @0920   DPA spoke with Ivana, unable to take patient today as they do not have a isolation room available. Can take patient tomorrow or Monday.    POWERW notified.

## 2021-08-01 PROCEDURE — 94760 N-INVAS EAR/PLS OXIMETRY 1: CPT

## 2021-08-01 PROCEDURE — 700101 HCHG RX REV CODE 250: Performed by: INTERNAL MEDICINE

## 2021-08-01 PROCEDURE — 700111 HCHG RX REV CODE 636 W/ 250 OVERRIDE (IP): Performed by: INTERNAL MEDICINE

## 2021-08-01 PROCEDURE — 770020 HCHG ROOM/CARE - TELE (206)

## 2021-08-01 PROCEDURE — 99232 SBSQ HOSP IP/OBS MODERATE 35: CPT | Performed by: INTERNAL MEDICINE

## 2021-08-01 PROCEDURE — A9270 NON-COVERED ITEM OR SERVICE: HCPCS | Performed by: INTERNAL MEDICINE

## 2021-08-01 PROCEDURE — 700102 HCHG RX REV CODE 250 W/ 637 OVERRIDE(OP): Performed by: INTERNAL MEDICINE

## 2021-08-01 RX ADMIN — VANCOMYCIN HYDROCHLORIDE 500 MG: KIT ORAL at 12:13

## 2021-08-01 RX ADMIN — METRONIDAZOLE 500 MG: 500 INJECTION, SOLUTION INTRAVENOUS at 13:45

## 2021-08-01 RX ADMIN — HEPARIN SODIUM 5000 UNITS: 5000 INJECTION, SOLUTION INTRAVENOUS; SUBCUTANEOUS at 17:15

## 2021-08-01 RX ADMIN — HEPARIN SODIUM 5000 UNITS: 5000 INJECTION, SOLUTION INTRAVENOUS; SUBCUTANEOUS at 05:52

## 2021-08-01 RX ADMIN — ONDANSETRON 4 MG: 2 INJECTION INTRAMUSCULAR; INTRAVENOUS at 21:02

## 2021-08-01 RX ADMIN — FUROSEMIDE 20 MG: 10 INJECTION, SOLUTION INTRAMUSCULAR; INTRAVENOUS at 05:52

## 2021-08-01 RX ADMIN — METRONIDAZOLE 500 MG: 500 INJECTION, SOLUTION INTRAVENOUS at 23:10

## 2021-08-01 RX ADMIN — VANCOMYCIN HYDROCHLORIDE 500 MG: KIT ORAL at 00:50

## 2021-08-01 RX ADMIN — VANCOMYCIN HYDROCHLORIDE 500 MG: KIT ORAL at 05:53

## 2021-08-01 RX ADMIN — METRONIDAZOLE 500 MG: 500 INJECTION, SOLUTION INTRAVENOUS at 05:53

## 2021-08-01 RX ADMIN — VANCOMYCIN HYDROCHLORIDE 500 MG: KIT ORAL at 23:10

## 2021-08-01 RX ADMIN — SIMVASTATIN 10 MG: 10 TABLET, FILM COATED ORAL at 17:14

## 2021-08-01 RX ADMIN — VANCOMYCIN HYDROCHLORIDE 500 MG: KIT ORAL at 17:15

## 2021-08-01 ASSESSMENT — ENCOUNTER SYMPTOMS
WEAKNESS: 0
SHORTNESS OF BREATH: 0
CLAUDICATION: 0
PHOTOPHOBIA: 0
INSOMNIA: 0
CHILLS: 0
SPEECH CHANGE: 0
DIZZINESS: 0
VOMITING: 0
ABDOMINAL PAIN: 0
FEVER: 0
DEPRESSION: 0
NERVOUS/ANXIOUS: 0
SENSORY CHANGE: 0
CONSTIPATION: 0
HEARTBURN: 0
DIARRHEA: 1
BLURRED VISION: 0
MYALGIAS: 0
COUGH: 0
HEADACHES: 0

## 2021-08-01 ASSESSMENT — PAIN DESCRIPTION - PAIN TYPE: TYPE: ACUTE PAIN

## 2021-08-01 NOTE — PROGRESS NOTES
Monitor Summary     Rhythm:paced   Measurements: paced  ECTOPIES: rpvc, rcouplets        Normal Values  Rhythm SR  HR Range    Measurements 0.12-0.20 / 0.06-0.10  / 0.30-0.52

## 2021-08-01 NOTE — CARE PLAN
Problem: Knowledge Deficit - Standard  Goal: Patient and family/care givers will demonstrate understanding of plan of care, disease process/condition, diagnostic tests and medications  Outcome: Progressing     Problem: Bowel Elimination  Goal: Establish and maintain regular bowel function  Outcome: Progressing   The patient is Stable - Low risk of patient condition declining or worsening    Shift Goals  Clinical Goals: less frequent BM  Patient Goals: rest and ambulate to bathroom  Family Goals: rest, support,    Progress made toward(s) clinical / shift goals:  pt updated on plan of care, pt reports the urge to have BM now instead of incontinence. Will offer frequent toileting.     Patient is not progressing towards the following goals:

## 2021-08-01 NOTE — CARE PLAN
The patient is Stable - Low risk of patient condition declining or worsening    Shift Goals  Clinical Goals: maintain O2 overnight  Patient Goals: rest and ambulate to bathroom  Family Goals: rest, support,    Progress made toward(s) clinical / shift goals:  Pt resting comfortably in bed    Patient is not progressing towards the following goals:        Problem: Knowledge Deficit - Standard  Goal: Patient and family/care givers will demonstrate understanding of plan of care, disease process/condition, diagnostic tests and medications  Outcome: Progressing

## 2021-08-01 NOTE — PROGRESS NOTES
"Hospital Medicine Daily Progress Note    Date of Service  8/1/2021    Chief Complaint  Cesia Franklin is a 89 y.o. female admitted 7/26/2021 with sepsis and diarrhea.     Hospital Course  Very pleasant 89-year-old female with past medical history of pacemaker placement, hypertension, hyperlipidemia, hydrochlorothiazide, lisinopril, metoprolol, omeprazole, simvastatin transferred from [facility] in [place], [place] on 7/26/2021 for septic shock due to C. difficile pancolitis.  She presented there on 7/23 with complaints of weakness and diarrhea.     Prior to admission she had several days of soft normal colored stools without blood along with occasional nausea and nonbloody nonbilious emesis.  She was admitted and started on IV fluids and empiric antibiotics with vancomycin/Zosyn.  KARIS noted on admission.  She became hypotensive and febrile on hospital day #2, prompting labs showing a substantial leukocytosis and C. difficile positive stool.  CT abdomen/pelvis 7/25 revealed pancolitis without SBO or megacolon.  Diverticulosis without diverticulitis in the descending and sigmoid colon. IV antibiotics were discontinued and she was transitioned to oral vancomycin and IV Flagyl.  Blood cultures reportedly negative.  Hypotension was refractory to resuscitation she was started on IV vasopressors for hemodynamic support was subsequently transferred for higher level of care.  She has improved and is no longer needing pressors.  Central IV discontinued and she is now in slight volume overload so diuresing.        Interval Problem Update  7/29 Patient feeling okay but still remains very weak and PT is recommending SNF placement.  She is still having loose stools but they are slightly more formed like \"pudding\".  I will advance her diet to GI soft.    7/30 Patient up to chair and feeling slightly stronger.  She is still having loose bowel movements and the character hasn't changed.  She is " tolerating a GI soft diet and appetite is improving.  She denies any pain.   7/31 Patient doing okay today, was supposed to transition to [facility] today but they don't have an isolation bed available today.   8/1 Patient tearful today as she is uncertain about moving to SNF because it is a new experience for her.  Reassurance given that she will be more active there with more therapies and its one step closer to returning home.  Stools are starting to firm up more per report.      I have personally seen and examined the patient at bedside. I discussed the plan of care with patient, bedside RN, charge RN,  and pharmacy.    Consultants/Specialty  critical care s/o  ID s/o    Code Status  Full Code    Disposition  Patient is medically cleared.   Anticipate discharge to to skilled nursing facility.  I have placed the appropriate orders for post-discharge needs.    Review of Systems  Review of Systems   Constitutional: Negative for chills and fever.   HENT: Negative for congestion.    Eyes: Negative for blurred vision and photophobia.   Respiratory: Negative for cough and shortness of breath.    Cardiovascular: Negative for chest pain, claudication and leg swelling.   Gastrointestinal: Positive for diarrhea (more solids). Negative for abdominal pain, constipation, heartburn and vomiting.   Genitourinary: Negative for dysuria and hematuria.   Musculoskeletal: Negative for joint pain and myalgias.   Skin: Negative for itching and rash.   Neurological: Negative for dizziness, sensory change, speech change, weakness and headaches.   Psychiatric/Behavioral: Negative for depression. The patient is not nervous/anxious and does not have insomnia.         Physical Exam  Temp:  [36.4 °C (97.6 °F)-36.9 °C (98.5 °F)] 36.7 °C (98.1 °F)  Pulse:  [60-71] 60  Resp:  [14-19] 18  BP: (127-150)/(46-71) 143/69  SpO2:  [92 %-100 %] 96 %    Physical Exam  Vitals and nursing note reviewed.   Constitutional:       General: She is  not in acute distress.     Appearance: Normal appearance. She is not ill-appearing.   HENT:      Head: Normocephalic and atraumatic.      Nose: Nose normal.   Cardiovascular:      Rate and Rhythm: Normal rate and regular rhythm.      Heart sounds: Normal heart sounds. No murmur heard.     Pulmonary:      Effort: Pulmonary effort is normal.      Breath sounds: Normal breath sounds.   Abdominal:      General: Bowel sounds are normal. There is no distension.      Palpations: Abdomen is soft.   Musculoskeletal:         General: No swelling or tenderness.      Cervical back: Neck supple.      Right lower leg: Edema (mild) present.      Left lower leg: Edema (mild) present.   Skin:     General: Skin is warm and dry.   Neurological:      General: No focal deficit present.      Mental Status: She is alert and oriented to person, place, and time.   Psychiatric:         Mood and Affect: Mood normal.         Fluids    Intake/Output Summary (Last 24 hours) at 8/1/2021 1037  Last data filed at 8/1/2021 0741  Gross per 24 hour   Intake --   Output 1910 ml   Net -1910 ml       Laboratory  Recent Labs     07/30/21  0044   WBC 14.1*   RBC 3.15*   HEMOGLOBIN 9.7*   HEMATOCRIT 29.3*   MCV 93.0   MCH 30.8   MCHC 33.1*   RDW 45.1   PLATELETCT 349   MPV 9.6     Recent Labs     07/30/21  0044   SODIUM 138   POTASSIUM 3.8   CHLORIDE 106   CO2 23   GLUCOSE 111*   BUN 12   CREATININE 0.86   CALCIUM 8.7                   Imaging  DX-CHEST-PORTABLE (1 VIEW)   Final Result      1.  Cardiomegaly.   2.  Bilateral pulmonary opacities most likely pulmonary edema/CHF.   3.  Possible small left pleural effusion.      DI-LFFUTTV-0 VIEW   Final Result      Nonobstructive bowel gas pattern.      OUTSIDE IMAGES-CT ABDOMEN /PELVIS   Final Result      ES-ZTDOFJA-6 VIEW   Final Result      1.  No evidence of bowel obstruction.      2.  Likely areas of wall thickening involving the colon consistent with patient's history C. Difficile colitis.       DX-CHEST-FOR LINE PLACEMENT Perform procedure in: Patient's Room   Final Result      No pneumothorax identified following right IJ line placement      Mild hazy opacity is concerning for interstitial edema      Mild aortic ectasia      OI-SLBBCNK-1 VIEW   Final Result      1.  Bowel gas pattern is nonobstructive.   2.  There is a question of colonic wall thickening in the right lower quadrant and left lower quadrant. This is nonspecific. This could be due to inflammation or infection.   3.  There is a left hemipelvic calcification, possibly a calcified uterine fibroid.           Assessment/Plan  * Septic shock due to Clostridioides difficile (HCC)  Assessment & Plan  Resolved  Vanco/flagyl through 8/8/2021 per ID      Leukocytosis  Assessment & Plan  Improved significantly, WBC stable      Hypotension  Assessment & Plan  Resolved, now hypertension  Volume overload improving, IV lasix daily        Hyponatremia  Assessment & Plan  Resolved      Full code status  Assessment & Plan  .      KARIS (acute kidney injury) (HCC)  Assessment & Plan  Resolved      History of pacemaker  Assessment & Plan  .      Pancolitis (HCC)  Assessment & Plan  Secondary to c diff colitis.   ID and surgery were following  No need for surgical intervention.          VTE prophylaxis: heparin ppx    I have performed a physical exam and reviewed and updated ROS and Plan today (8/1/2021). In review of yesterday's note (7/31/2021), there are no changes except as documented above.

## 2021-08-01 NOTE — PROGRESS NOTES
Telemetry Strip     Strip printed: 140  Measurements from am strip were as follows:  Rhythm: paced 100%  HR: 60-65  Measurements: -/ 0.14/ -  Ectopy: r PVC             Normal Values  Rhythm SR  HR Range    Measurements 0.12-0.20 / 0.06-0.10  / 0.30-0.52

## 2021-08-01 NOTE — PROGRESS NOTES
Tele strip at 0245 shows 100% V Paced SR.      Measurements from am strip were as follows:  DE=n/a  QRS=0.16  QT=n/a    Tele Shift Summary:    Rhythm : SR  Rate : 60-77  Ectopy : Per CCT Shalini, pt had R PVCs and PACs.     Telemetry monitoring strips placed in pt's chart.

## 2021-08-02 VITALS
BODY MASS INDEX: 29.47 KG/M2 | OXYGEN SATURATION: 92 % | SYSTOLIC BLOOD PRESSURE: 118 MMHG | WEIGHT: 172.62 LBS | HEART RATE: 64 BPM | RESPIRATION RATE: 20 BRPM | TEMPERATURE: 97.4 F | HEIGHT: 64 IN | DIASTOLIC BLOOD PRESSURE: 63 MMHG

## 2021-08-02 LAB
ANION GAP SERPL CALC-SCNC: 6 MMOL/L (ref 7–16)
BUN SERPL-MCNC: 9 MG/DL (ref 8–22)
CALCIUM SERPL-MCNC: 8.7 MG/DL (ref 8.4–10.2)
CHLORIDE SERPL-SCNC: 102 MMOL/L (ref 96–112)
CO2 SERPL-SCNC: 30 MMOL/L (ref 20–33)
CREAT SERPL-MCNC: 0.75 MG/DL (ref 0.5–1.4)
ERYTHROCYTE [DISTWIDTH] IN BLOOD BY AUTOMATED COUNT: 46.1 FL (ref 35.9–50)
GLUCOSE SERPL-MCNC: 108 MG/DL (ref 65–99)
HCT VFR BLD AUTO: 29 % (ref 37–47)
HGB BLD-MCNC: 9.4 G/DL (ref 12–16)
MCH RBC QN AUTO: 30.5 PG (ref 27–33)
MCHC RBC AUTO-ENTMCNC: 32.4 G/DL (ref 33.6–35)
MCV RBC AUTO: 94.2 FL (ref 81.4–97.8)
PLATELET # BLD AUTO: 334 K/UL (ref 164–446)
PMV BLD AUTO: 9.4 FL (ref 9–12.9)
POTASSIUM SERPL-SCNC: 3.6 MMOL/L (ref 3.6–5.5)
RBC # BLD AUTO: 3.08 M/UL (ref 4.2–5.4)
SODIUM SERPL-SCNC: 138 MMOL/L (ref 135–145)
WBC # BLD AUTO: 9.9 K/UL (ref 4.8–10.8)

## 2021-08-02 PROCEDURE — 700102 HCHG RX REV CODE 250 W/ 637 OVERRIDE(OP): Performed by: INTERNAL MEDICINE

## 2021-08-02 PROCEDURE — 700101 HCHG RX REV CODE 250: Performed by: INTERNAL MEDICINE

## 2021-08-02 PROCEDURE — 85027 COMPLETE CBC AUTOMATED: CPT

## 2021-08-02 PROCEDURE — 700111 HCHG RX REV CODE 636 W/ 250 OVERRIDE (IP): Performed by: INTERNAL MEDICINE

## 2021-08-02 PROCEDURE — 80048 BASIC METABOLIC PNL TOTAL CA: CPT

## 2021-08-02 PROCEDURE — A9270 NON-COVERED ITEM OR SERVICE: HCPCS | Performed by: INTERNAL MEDICINE

## 2021-08-02 PROCEDURE — 99239 HOSP IP/OBS DSCHRG MGMT >30: CPT | Performed by: INTERNAL MEDICINE

## 2021-08-02 PROCEDURE — 94760 N-INVAS EAR/PLS OXIMETRY 1: CPT

## 2021-08-02 RX ADMIN — VANCOMYCIN HYDROCHLORIDE 500 MG: KIT ORAL at 12:54

## 2021-08-02 RX ADMIN — METRONIDAZOLE 500 MG: 500 INJECTION, SOLUTION INTRAVENOUS at 12:55

## 2021-08-02 RX ADMIN — VANCOMYCIN HYDROCHLORIDE 500 MG: KIT ORAL at 05:36

## 2021-08-02 RX ADMIN — HEPARIN SODIUM 5000 UNITS: 5000 INJECTION, SOLUTION INTRAVENOUS; SUBCUTANEOUS at 05:37

## 2021-08-02 RX ADMIN — FUROSEMIDE 20 MG: 10 INJECTION, SOLUTION INTRAMUSCULAR; INTRAVENOUS at 05:36

## 2021-08-02 RX ADMIN — METRONIDAZOLE 500 MG: 500 INJECTION, SOLUTION INTRAVENOUS at 05:37

## 2021-08-02 ASSESSMENT — PAIN DESCRIPTION - PAIN TYPE: TYPE: ACUTE PAIN

## 2021-08-02 NOTE — DISCHARGE PLANNING
Received Transport Form @ 1000  Spoke to Carlitos BREWSTER @ 1033    Transport is scheduled for today  @4803 going to [facility].

## 2021-08-02 NOTE — CARE PLAN
The patient is Stable - Low risk of patient condition declining or worsening    Shift Goals  Clinical Goals: rest  Patient Goals: rest  Family Goals: rest, support,    Progress made toward(s) clinical / shift goals:  pt sleeping    Patient is not progressing towards the following goals:      Problem: Fall Risk  Goal: Patient will remain free from falls  Outcome: Progressing   Patient calls appropriately and ambulates safely with the assistance of a staff member.

## 2021-08-02 NOTE — DISCHARGE INSTRUCTIONS
Discharge Instructions    Discharged to other by ambulance with self. Discharged via ambulance, hospital escort: Yes.  Special equipment needed: Not Applicable    Be sure to schedule a follow-up appointment with your primary care doctor or any specialists as instructed.     Discharge Plan:   Diet Plan: Discussed  Activity Level: Discussed  Confirmed Follow up Appointment: No (Comments) (discharge to SNF)  Confirmed Symptoms Management: Discussed  Medication Reconciliation Updated: Yes    I understand that a diet low in cholesterol, fat, and sodium is recommended for good health. Unless I have been given specific instructions below for another diet, I accept this instruction as my diet prescription.   Other diet: regular    Special Instructions: None    · Is patient discharged on Warfarin / Coumadin?   No     Depression / Suicide Risk    As you are discharged from this [facility] facility, it is important to learn how to keep safe from harming yourself.    Recognize the warning signs:  · Abrupt changes in personality, positive or negative- including increase in energy   · Giving away possessions  · Change in eating patterns- significant weight changes-  positive or negative  · Change in sleeping patterns- unable to sleep or sleeping all the time   · Unwillingness or inability to communicate  · Depression  · Unusual sadness, discouragement and loneliness  · Talk of wanting to die  · Neglect of personal appearance   · Rebelliousness- reckless behavior  · Withdrawal from people/activities they love  · Confusion- inability to concentrate     If you or a loved one observes any of these behaviors or has concerns about self-harm, here's what you can do:  · Talk about it- your feelings and reasons for harming yourself  · Remove any means that you might use to hurt yourself (examples: pills, rope, extension cords, firearm)  · Get professional help from the community (Mental Health, Substance Abuse, psychological  counseling)  · Do not be alone:Call your Safe Contact- someone whom you trust who will be there for you.  · Call your local CRISIS HOTLINE 026-0476 or 697-679-6653  · Call your local Children's Mobile Crisis Response Team [place] (286) 755-5600 or www.[employer]  · Call the toll free National Suicide Prevention Hotlines   · National Suicide Prevention Lifeline 926-519-OQOZ (1624)  · "[employer] Line Network 800-SUICIDE (827-5026)      Clostridioides Difficile Test  Why am I having this test?  This test is performed to check for Clostridioides difficile (C. difficile or C. diff) bacteria in your stool. When you have a C. diff infection, toxins made by these bacteria can cause damage to the lining of your colon and may lead to colitis.  Your health care provider may recommend this test if you have had diarrhea and have been taking antibiotic medicine for more than 5 days. The test may also be done if you have a weakened defense (immune) system and you develop diarrhea, even if you are not taking antibiotics.  What is being tested?  This test checks your stool for the presence of toxins made by C. diffbacteria.  What kind of sample is taken?  A stool sample is required for this test. This stool sample may be obtained by:  · Collection at home in a germ-free (sterile) container that is given to you by the lab.  · Proctoscopy or colonoscopy. These tests are done to examine the rectum and the intestines. A stool sample is obtained during the exam.  · Collection from an incontinence pad.  How do I collect samples at home?  When collecting a sample at home, make sure that you:  · Follow instructions and use supplies that you received from the lab.  · Have a bowel movement directly into a clean, dry container. Do not collect stool from the water in the toilet.  · Transfer the sample into the sterile container that you received from the lab.  · Do not let any toilet paper or urine get into the container.  · Use  soap and water to wash your hands thoroughly after you use the bathroom.  · Refrigerate the sample if it cannot be returned to the lab right away.  · Return the sample(s) to the lab as instructed.  How do I prepare for this test?  There is no preparation required for this test. If you are asked to collect a stool sample at home, your health care provider will give you the supplies and instructions you need.  How are the results reported?  · Your test results will be reported as either positive or negative.  · Occasionally test results may be incorrect. This means:  ? A false-positive result can occur. A false positive is incorrect because it means that a condition is present when it is not.  ? A false-negative result can occur. A false negative is incorrect because it means that a condition is not present when it is.  Your health care provider will talk to you about doing more tests to confirm your results.  What do the results mean?  · A negative test result means that there was no C. diff toxin identified in your stool.  · A positive test result means that C. diff toxin was identified in your stool. A positive test may mean that you have C. diff infection and possible colitis.  Talk with your health care provider about what your results mean.  Questions to ask your health care provider  Ask your health care provider, or the department that is doing the test:  · When will my results be ready?  · How will I get my results?  · What are my treatment options?  · What other tests do I need?  · What are my next steps?  Summary  · Clostridioides difficile (C. difficile or C. diff) test examines your stool for the presence of toxins that are made by the C. diff bacteria. These bacteria can damage the lining of your colon and may lead to colitis.  · You may be asked to collect a stool sample at home. Follow instructions and use the sterile container given by the lab.  · Results will be reported as either positive or  negative, with positive meaning that the C. diff toxins are present in your stool, and negative meaning that the toxins are not present in your stool.  · If you have a positive result, ask your health care provider about your treatment options and what your next steps are.  This information is not intended to replace advice given to you by your health care provider. Make sure you discuss any questions you have with your health care provider.  Document Released: 01/10/2006 Document Revised: 06/18/2019 Document Reviewed: 07/18/2018  Elsevier Patient Education © 2020 [employer]

## 2021-08-02 NOTE — PROGRESS NOTES
Discharge order written. IV removed, patient tolerated. All personal belongings are in possession. AVS printed, reviewed and copy signed and placed on the chart.   Discharged in satisfactory condition.PT left unit with [facility] via deja. Pt discharged to [facility] via [facility]

## 2021-08-02 NOTE — PROGRESS NOTES
Tele strip at 0250 shows 100% V paced SR.      Measurements from am strip were as follows:  ME=n/a  QRS=0.12  QT=0.42    Tele Shift Summary:    Rhythm : SR  Rate : 60-70s  Ectopy : Per CCT Selina, pt had F PVCs.     Telemetry monitoring strips placed in pt's chart.

## 2021-08-02 NOTE — DISCHARGE PLANNING
Anticipated Discharge Disposition: SNF    Action: LSW completed chart review. Per chart review, pt will need an isolation bed at SNF. Discussed pt in morning rounds. Per MD, pt is medically cleared to d/c to SNF. LSW requested DPA follow up with SNF for isolation bed.     Barriers to Discharge: Bed availability    Plan: LSW to follow up with DPA.    1000: LSW notified by DPA that pt has been accepted and would like transport set up for after 1630. LSW completed transport forms and faxed them to [facility]. LSW to await transport confirmation from [facility].     1115: Transport to [facility] confirmed for 1645. Bedside RN and MD notified.     1345: LSW met w/ pt at bedside to get signature for [employer] and deliver IMM. Pt expressed understanding and signed IMM 8/2/2021 at 1345. LSW provided pt with copy of IMM. LSW called pt's daughter Susan ([phone]) with pt's consent to notify her of pt/s transfer. LSW left VM. LSW placed transfer packet in chart. Bedside RN aware. LSW faxed IMM to DPA.    1430: LSW received call from pt's daughter Susan. Per Susan, she received this LSW VM.

## 2021-08-02 NOTE — DISCHARGE PLANNING
Agency/Facility Name: [facility]  Spoke To: Ivana  Outcome: Pt accepted anytime after 16:30    RN CM notified

## 2021-08-03 NOTE — PROGRESS NOTES
Telemetry Shift Summary    Rhythm Paced with a BBB  HR Range 65-69  Ectopy rare PVCs  Measurements -/.14/-        Normal Values  Rhythm SR  HR Range    Measurements 0.12-0.20 / 0.06-0.10  / 0.30-0.52

## 2023-04-20 NOTE — PROGRESS NOTES
Pt complains of being short of breath. Primary RN noted crackles in lungs and pitting edema in bilateral lower extremities. Provider contacted. DX of chest ordered and BNP. Chest DX confirmed bilateral opacities most likely r/t pulmonary edema. Primary RN notified provider and lasix has been added to the MAR. Primary RN also notfiied provider of medications ( lisinopril, metoprolol, omeprazole, hydrochlorothiazide) that had previously been held. Will pass on to day shift.    Unknown